# Patient Record
Sex: MALE | Race: WHITE | NOT HISPANIC OR LATINO | Employment: OTHER | URBAN - METROPOLITAN AREA
[De-identification: names, ages, dates, MRNs, and addresses within clinical notes are randomized per-mention and may not be internally consistent; named-entity substitution may affect disease eponyms.]

---

## 2022-10-04 ENCOUNTER — OFFICE VISIT (OUTPATIENT)
Dept: CARDIOLOGY CLINIC | Facility: CLINIC | Age: 57
End: 2022-10-04
Payer: COMMERCIAL

## 2022-10-04 VITALS
DIASTOLIC BLOOD PRESSURE: 78 MMHG | HEIGHT: 71 IN | WEIGHT: 233 LBS | SYSTOLIC BLOOD PRESSURE: 110 MMHG | HEART RATE: 68 BPM | BODY MASS INDEX: 32.62 KG/M2

## 2022-10-04 DIAGNOSIS — R73.03 PREDIABETES: ICD-10-CM

## 2022-10-04 DIAGNOSIS — R09.89 RIGHT CAROTID BRUIT: ICD-10-CM

## 2022-10-04 DIAGNOSIS — Z95.5 PRESENCE OF DRUG COATED STENT IN LAD CORONARY ARTERY: ICD-10-CM

## 2022-10-04 DIAGNOSIS — I25.5 ISCHEMIC CARDIOMYOPATHY: ICD-10-CM

## 2022-10-04 DIAGNOSIS — I21.02 MYOCARDIAL INFARCTION INVOLVING LEFT ANTERIOR DESCENDING (LAD) CORONARY ARTERY, UNSPECIFIED MI TYPE (HCC): Primary | ICD-10-CM

## 2022-10-04 DIAGNOSIS — E78.2 MIXED HYPERLIPIDEMIA: ICD-10-CM

## 2022-10-04 DIAGNOSIS — I25.10 CORONARY ARTERY DISEASE INVOLVING NATIVE CORONARY ARTERY OF NATIVE HEART WITHOUT ANGINA PECTORIS: ICD-10-CM

## 2022-10-04 PROCEDURE — 99204 OFFICE O/P NEW MOD 45 MIN: CPT | Performed by: INTERNAL MEDICINE

## 2022-10-04 PROCEDURE — 93000 ELECTROCARDIOGRAM COMPLETE: CPT | Performed by: INTERNAL MEDICINE

## 2022-10-04 RX ORDER — NITROGLYCERIN 0.4 MG/1
0.4 TABLET SUBLINGUAL
Qty: 100 TABLET | Refills: 0 | Status: SHIPPED | OUTPATIENT
Start: 2022-10-04

## 2022-10-04 RX ORDER — LANOLIN ALCOHOL/MO/W.PET/CERES
1 CREAM (GRAM) TOPICAL
COMMUNITY

## 2022-10-04 RX ORDER — ASPIRIN 81 MG/1
81 TABLET, COATED ORAL DAILY
COMMUNITY
Start: 2022-09-29

## 2022-10-04 RX ORDER — LOSARTAN POTASSIUM 25 MG/1
25 TABLET ORAL DAILY
COMMUNITY
Start: 2022-09-28 | End: 2023-09-25

## 2022-10-04 RX ORDER — FOLIC ACID 1 MG/1
800 TABLET ORAL
COMMUNITY

## 2022-10-04 RX ORDER — ATORVASTATIN CALCIUM 80 MG/1
80 TABLET, FILM COATED ORAL
COMMUNITY
Start: 2022-09-29

## 2022-10-04 RX ORDER — CARVEDILOL 3.12 MG/1
6.25 TABLET ORAL 2 TIMES DAILY WITH MEALS
COMMUNITY
Start: 2022-09-29

## 2022-10-04 NOTE — PATIENT INSTRUCTIONS
You were seen today in the Cardiology office for post hospital follow up after a heart attack  Please continue your current cardiac medications as prescribed  We discussed the benefits of (weight loss, healthy low-fat diet, salt reduction, fluid restriction)    Thank you for choosing [address]  Please call our office or use [employer] with any questions

## 2022-10-04 NOTE — PROGRESS NOTES
[facility]    Name:Addy Pineda   DOS: 10/4/2022     Chief Complaint:   Chief Complaint   Patient presents with    Follow-up     Had a heart attack 09/2022  "Just tired"  HISTORY OF PRESENT ILLNESS:    HPI:  Lenny Waldrop is a 61 y o  male  He  has a past medical history of CAD (coronary artery disease), Prediabetes, Presence of drug coated stent in LAD coronary artery (09/27/2022), and Unstable angina ([address] ) (09/2022)  He presents to establish care with a Cardiologist after a recent hospitalization for unstable angina that resulted in him receiving 2 drug eluting stents to his LAD  He works in construction locally, and states that he was in [place] last week doing a job there  While working, he began to experience indigestion with chest discomfort that gradually worsened  He presented to the hospital, and was found to have an anterior wall motion abnormality which prompted further evaluation with coronary angiography  Per records from his index hospitalization for Unstable Angina:  TTE:  Left Ventricle: Left ventricle size is normal  Findings consistent with eccentric hypertrophy  Mildly increased wall thickness  Regional wall motion abnormalities present  See diagram for wall motion findings  Mildly reduced systolic function  Estimated EF in the range of 40 - 45%  Grade I diastolic dysfunction  Normal LV diastolic pressure  Left Atrium: Left atrium is dilated  Right Ventricle: Not well visualized  Right ventricle size is normal  Low normal systolic function  [facility] 9/27/22:  Cardiac catheterization - Right radial access  Hemodynamic assessment demonstrated normal arterial pressure  Dominance: Right  Left Anterior Descending   There is 90% stenosis of the Prox LAD to Mid LAD  JHONATHAN flow is 2  This lesion is a likely culprit for the patient's clinical presentation  There is 70% stenosis of the Mid LAD  JHONATHAN flow is 2       First Diagonal Branch   There is 20% stenosis of the 1st Diag  Sequential mid LAD lesions (90% culprit, 70% more distal) treated with overlapping JOSETTE x 2       Today, he reports that he feels fatigue since his hospital discharge but otherwise has no active complaints  He has had no recurrence of his chest discomfort  He has had a few episodes of upset stomach from taking his medications on an empty stomach, but this has completely resolved after he started taking them with food  He denies chest pain, shortness of breath, diaphoresis, dizziness, palpitations, orthopnea, PND, edema, syncope  His procedure was right radial access, and the site is healing well with no pain/numbness/hematoma  He has no prior history of heart disease  He does have a family history of atrial fibrillation  No known family history of CAD, heart failure, or stroke  He denies regular tobacco use  Does smoke a cigar 1-2 times per year  Drinks socially in varying quantities  Denies recreational drug use  ROS    ROS: Pertinent positives and negatives as described in History of Present Illness  Remainder of a 14 point review of systems was negative  No Known Allergies     Current Outpatient Medications on File Prior to Visit   Medication Sig Dispense Refill    Aspirin Low Dose 81 MG EC tablet Take 81 mg by mouth daily      atorvastatin (LIPITOR) 80 mg tablet Take 80 mg by mouth daily at bedtime      carvedilol (COREG) 3 125 mg tablet Take 6 25 mg by mouth 2 (two) times a day with meals      folic acid (FOLVITE) 1 mg tablet Take 800 mcg by mouth      glucosamine-chondroitin 500-400 MG tablet Take 1 tablet by mouth      losartan (COZAAR) 25 mg tablet Take 25 mg by mouth daily      ticagrelor (BRILINTA) 90 MG Take 90 mg by mouth 2 (two) times a day       No current facility-administered medications on file prior to visit         Past Medical History:   Diagnosis Date    CAD (coronary artery disease)     Prediabetes     Presence of drug coated stent in LAD coronary artery 09/27/2022    JOSETTE x 2 to proximal and mid LAD @ [place]  Overlapping  38 and 16mm   Unstable angina ([facility] [address] 09/2022       No past surgical history on file  No family history on file  Social History     Socioeconomic History    Marital status: /Civil Union     Spouse name: Not on file    Number of children: Not on file    Years of education: Not on file    Highest education level: Not on file   Occupational History    Not on file   Tobacco Use    Smoking status: Not on file    Smokeless tobacco: Not on file   Substance and Sexual Activity    Alcohol use: Not on file    Drug use: Not on file    Sexual activity: Not on file   Other Topics Concern    Not on file   Social History Narrative    Not on file     Social Determinants of Health     Financial Resource Strain: Not on file   Food Insecurity: Not on file   Transportation Needs: Not on file   Physical Activity: Not on file   Stress: Not on file   Social Connections: Not on file   Intimate Partner Violence: Not on file   Housing Stability: Not on file       OBJECTIVE:    /78 (BP Location: Left arm, Patient Position: Sitting, Cuff Size: Large)   Pulse 68   Ht 5' 11" (1 803 m)   Wt 106 kg (233 lb)   BMI 32 50 kg/m²      BP Readings from Last 3 Encounters:   10/04/22 110/78       Wt Readings from Last 3 Encounters:   10/04/22 106 kg (233 lb)         Physical Exam  Vitals reviewed  Constitutional:       General: He is not in acute distress  Appearance: Normal appearance  He is not diaphoretic  HENT:      Head: Normocephalic and atraumatic  Eyes:      Conjunctiva/sclera: Conjunctivae normal    Neck:      Vascular: Carotid bruit (Right) present  No JVD  Cardiovascular:      Rate and Rhythm: Normal rate and regular rhythm  Pulses: Normal pulses  Heart sounds: Normal heart sounds  No murmur heard  No friction rub  No gallop  Comments: Right radial access site is well healed   Non-tender, no hematoma  Abdominal:      General: Abdomen is flat  Bowel sounds are normal  There is no distension  Palpations: Abdomen is soft  Musculoskeletal:      Right lower leg: No edema  Left lower leg: No edema  Skin:     General: Skin is warm and dry  Neurological:      General: No focal deficit present  Mental Status: He is alert and oriented to person, place, and time  Psychiatric:         Mood and Affect: Mood normal          Behavior: Behavior normal                                                        Cardiac testing:   EKG reviewed personally 10/4/22: Normal sinus rhythm  Anterior T-wave inversions with age indeterminate septal infarct pattern  LABS:  No results found for: GLUCOSE, BUN, CREATININE, CALCIUM, NA, K, CO2, CL, ALKPHOS, BILITOT, PROT, AST, ALT, ANIONGAP     No results found for: WBC, HGB, HCT, MCV, PLT    No results found for: CHOL, HDL, LDLCALC, TRIG    Lab Results   Component Value Date    HGBA1C 6 0 09/28/2022       No results found for: TSH      ASSESSMENT/PLAN:  Diagnoses and all orders for this visit:    Myocardial infarction involving left anterior descending (LAD) coronary artery, unspecified MI type ([facility] [address] )  Coronary artery disease involving native coronary artery of native heart without angina pectoris  Presence of drug coated stent in LAD coronary artery  - S/P JOSETTE to his LAD x2 in the setting of unstable angina  No recurrence of pain  - Continue Aspirin, Brilinta, Coreg, Losartan  - Referral to cardiac rehab  -     POCT ECG  -     Ambulatory  Referral to Cardiac Rehabilitation; Future  -     nitroglycerin (NITROSTAT) 0 4 mg SL tablet; Place 1 tablet (0 4 mg total) under the tongue every 5 (five) minutes as needed for chest pain If no relief with 2 doses, call 911  Ischemic cardiomyopathy  - In the setting of unstable angina requiring PCI to the LAD as noted above  LVEF mildly reduced, 40-45%  - On appropriate medical therapy, Coreg and Losartan  Re-evaluate LVEF in 90 days after revascularization  Mixed hyperlipidemia  - Per records from [facility] (scanned into the chart)   Lipid panel on 1/20/21 - Total cholesterol 237  HDL 71  Trig 120      - Continue high intensity statin therapy  Prediabetes  BMI Counseling: Body mass index is 32 5 kg/m²  The BMI is above normal  Nutrition recommendations include consuming healthier snacks, decreasing soda and/or juice intake, moderation in carbohydrate intake, increasing intake of lean protein, reducing intake of saturated fat and trans fat and reducing intake of cholesterol  Exercise recommendations include exercising 3-5 times per week  With cardiac rehab  Goal will be to lose 10% body total body weight within the next 12 months with diet and exercise  Right carotid bruit  - Noted on exam today  No history of CVA or TIA  Will screen with an ultrasound  -     VAS carotid complete study; Future      Other orders  -     Aspirin Low Dose 81 MG EC tablet; Take 81 mg by mouth daily  -     atorvastatin (LIPITOR) 80 mg tablet; Take 80 mg by mouth daily at bedtime  -     carvedilol (COREG) 3 125 mg tablet; Take 6 25 mg by mouth 2 (two) times a day with meals  -     folic acid (FOLVITE) 1 mg tablet; Take 800 mcg by mouth  -     glucosamine-chondroitin 500-400 MG tablet; Take 1 tablet by mouth  -     losartan (COZAAR) 25 mg tablet; Take 25 mg by mouth daily  -     ticagrelor (BRILINTA) 90 MG;  Take 90 mg by mouth 2 (two) times a day                Classie Carrel, MD

## 2022-10-07 ENCOUNTER — CLINICAL SUPPORT (OUTPATIENT)
Dept: CARDIAC REHAB | Facility: HOSPITAL | Age: 57
End: 2022-10-07
Attending: INTERNAL MEDICINE
Payer: COMMERCIAL

## 2022-10-07 DIAGNOSIS — I21.02 MYOCARDIAL INFARCTION INVOLVING LEFT ANTERIOR DESCENDING (LAD) CORONARY ARTERY, UNSPECIFIED MI TYPE (HCC): ICD-10-CM

## 2022-10-07 DIAGNOSIS — Z95.5 PRESENCE OF DRUG COATED STENT IN LAD CORONARY ARTERY: Primary | ICD-10-CM

## 2022-10-07 DIAGNOSIS — I25.10 CORONARY ARTERY DISEASE INVOLVING NATIVE CORONARY ARTERY OF NATIVE HEART WITHOUT ANGINA PECTORIS: ICD-10-CM

## 2022-10-07 PROCEDURE — 93798 PHYS/QHP OP CAR RHAB W/ECG: CPT

## 2022-10-07 PROCEDURE — 93797 PHYS/QHP OP CAR RHAB WO ECG: CPT

## 2022-10-07 NOTE — PROGRESS NOTES
Cardiac Rehabilitation Plan of Care   Care Plan       Today's date: 10/7/2022   Visits: 1-evaluation  Patient name: Oseas Villanueva      : 1965  Age: 61 y o  MRN: [id number]  Referring Physician: Amelia Epps MD  Cardiologist: Dr Evette Richards  Provider: Cait  Clinician: Myesha Hayes MS, CEP      Dx:   Encounter Diagnoses   Name Primary?  Myocardial infarction involving left anterior descending (LAD) coronary artery, unspecified MI type ([facility] [address] )     Coronary artery disease involving native coronary artery of native heart without angina pectoris     Presence of drug coated stent in LAD coronary artery      Date of onset: 2022      SUMMARY OF PROGRESS:  Mr Lorne Keene is here today, accompanied by his wife, for his cardiac rehabilitation evaluation after recent MI and stenting procedure  He reports feeling well overall, but does note tiredness and low energy levels since MI  His main goals for his program are to return to work as a   He enjoys doing work outside at home, which he is eager to get back to doing without limitations also  He would like to establish a regular exercise program and continue exercising on his own swimming at the [facility] this winter  He would also like to lose weight -20 lbs ST  He reports he and his wife have been making significant changes to his diet over the past week since he had MI  They are working on decreasing fat and sodium and trying to find recipes that will fit into a heart healthy eating plan  They are also interested in meeting with a dietician for more information and meal planning  He denies depression (PHQ-9=9), but does note feeling tired and little interest in things right now  He also has some feelings of anxiety (SRI-7=4)  He reports he has had significant stress over the past several months, but feels he is in a better place at this time with that  Will focus on relaxation techniques to help manage any ongoing stress   He reports excellent support from his wife and family  He completed TM ETT today reaching THR at 9 min at 4 3 METs  He tolerated exercise well with normal hemodynamic response to exercise  Telemetry showed NSR  BP is stable within normal resting limits  He is in agreement to attend cardiac rehab sessions 3x/week for 12 weeks, or 36 sessions  He will start his sessions on 10/11/22        Medication compliance: Yes   Comments: n/a  Fall Risk: Low   Comments: n/a    EKG changes: NSR      EXERCISE ASSESSMENT and PLAN    Current Exercise Program in Rehab:       Frequency: 3days/week        Minutes: 30-40         METS: 3 0-3 5            HR: 30 beats above resting   RPE: 4-6         Modalities: Treadmill, UBE, Lifecycle, NuStep and Recumbent bike      Exercise Progression 30 Day Goals :    Frequency: 3 days/week   Minutes: 40   METS: 3 5   HR: 30 beats above resting    RPE: 4-6   Modalities: Treadmill, UBE, Lifecycle, Elliptical, Rower, NuStep and Recumbent bike    Strength trainin-3 days / week  12-15 repetitions  1-2 sets per modality   Will be added following 2-3 weeks of monitored exercise sessions   Modalities: Pull Downs, Lateral Raise, Arm Extension, Arm Curl, Seated Row, Front Raises, Shoulder Shrugs and leg ext    Progressing:  Reviewed Pt goals and determined plan of care    Home Exercise: Type: walking around property, Frequency: daily days/week, Duration: 20 mins    Goals: 10% improvement in functional capacity - based on max METs achieved in fitness assessment, improved DASI score by 10%, Increase in exercise capacity by 40% - based on peak METs tolerated in cardiac rehab exercise session, Exercise 5 days/wk, >150mins/wk of moderate intensity exercise, Resume ADLs with increased strength, Return to work unrestricted, Attend Rehab regularly, Decrease sitting time, Start a walking program, start a home exercise program and start swimming at [employer]: benefit of exercise for CAD risk factors, home exercise guidelines, AHA guidelines to achieve >150 mins/wk of moderate exercise and RPE scale   Plan:education on home exercise guidelines, home exercise 30+ mins 2 days opposite CR and Education class: Risk Factors for Heart Disease  Readiness to change: Action:  (Changing behavior)      NUTRITION ASSESSMENT AND PLAN    Weight control:    Starting weight: 234 6 lb   Current weight:     Diabetes: Pre-diabetes  Lipid management: Discussed diet and lipid management and Last lipid profile 9/28/2022  Chol 186    HDL 59  LDL 97  Goals:LDL <100, HDL >40, TRG <150 and CHOL <200  Education: heart healthy eating  low sodium diet  hydration  nutrition for  lipid management  nutrition for Improved BG control  target goal for A1c <7 0  exercise and diabetes management   wt  loss   Progressing:Reviewed Pt goals and determined plan of care  Plan: Education class: Reading Food Labels, Education Class: Heart Healthy Eating, refer to diabetes educator and refer to medical nutrition therapy  Readiness to change: Preparation:  (Getting ready to change)       PSYCHOSOCIAL ASSESSMENT AND PLAN    Emotional:  Depression assessment:  PHQ-9 = 5-9 = Mild Depression-denies feeling down, but reports little interest in things and tiredness            Anxiety measure:  SRI-7 = 0-4  = Not anxious  Self-reported stress level:  4  Social support: Excellent-wife  Goals:  Reduce perceived stress to 1-3/10, PHQ-9 - reduced severity by one level, Physical Fitness in [facility] Score < 3, Daily Activity in [facility] Score < 3, Social Activities in [facility] Score < 3, Pain in [facility] Score < 3, Overall Health in [facility] Score < 3, Quality of Life in [facility] Score < 3 , Change in Health in [facility] Score < 3 , Increased interest in doing things, improved concentration, improved positive thoughts of well being, increased energy, take time to relax and Feel less anxious  Education: signs/sxs of depression, benefits of a positive support system and stress management techniques  Progressing:Reviewed Pt goals and determined plan of care  Plan: Class: Stress and Your Health and Class: Relaxation  Readiness to change: Preparation:  (Getting ready to change)       OTHER CORE COMPONENTS     Tobacco:   Social History     Tobacco Use   Smoking Status Not on file   Smokeless Tobacco Not on file       Tobacco Use Intervention:   N/A:  Patient is a non-smoker     Blood pressure:    Restin/70   Exercise: 162/78    Goals: consistent BP < 130/80, reduced dietary sodium <2300mg, moderate intensity exercise >150 mins/wk, medication compliance and reduce number of medications  needed for BP control  Education:  understanding high blood pressure and it's relationship to CAD and low sodium diet and HTN  Progressing:Reviewed Pt goals and determined plan of care  Plan: Class: Understanding Heart Disease and Class: Common Heart Medications  Readiness to change: Action:  (Changing behavior)

## 2022-10-11 ENCOUNTER — CLINICAL SUPPORT (OUTPATIENT)
Dept: CARDIAC REHAB | Facility: HOSPITAL | Age: 57
End: 2022-10-11
Payer: COMMERCIAL

## 2022-10-11 DIAGNOSIS — Z95.5 PRESENCE OF DRUG COATED STENT IN LAD CORONARY ARTERY: Primary | ICD-10-CM

## 2022-10-11 PROCEDURE — 93798 PHYS/QHP OP CAR RHAB W/ECG: CPT

## 2022-10-12 ENCOUNTER — HOSPITAL ENCOUNTER (OUTPATIENT)
Dept: NON INVASIVE DIAGNOSTICS | Age: 57
Discharge: HOME | End: 2022-10-12
Payer: COMMERCIAL

## 2022-10-12 DIAGNOSIS — R09.89 RIGHT CAROTID BRUIT: ICD-10-CM

## 2022-10-12 PROCEDURE — 93880 EXTRACRANIAL BILAT STUDY: CPT

## 2022-10-12 PROCEDURE — 93880 EXTRACRANIAL BILAT STUDY: CPT | Performed by: SURGERY

## 2022-10-13 ENCOUNTER — CLINICAL SUPPORT (OUTPATIENT)
Dept: CARDIAC REHAB | Facility: HOSPITAL | Age: 57
End: 2022-10-13
Payer: COMMERCIAL

## 2022-10-13 DIAGNOSIS — Z95.5 PRESENCE OF DRUG COATED STENT IN LAD CORONARY ARTERY: Primary | ICD-10-CM

## 2022-10-13 PROCEDURE — 93798 PHYS/QHP OP CAR RHAB W/ECG: CPT

## 2022-10-14 ENCOUNTER — CLINICAL SUPPORT (OUTPATIENT)
Dept: CARDIAC REHAB | Facility: HOSPITAL | Age: 57
End: 2022-10-14
Payer: COMMERCIAL

## 2022-10-14 DIAGNOSIS — Z95.5 S/P CORONARY ARTERY STENT PLACEMENT: ICD-10-CM

## 2022-10-14 PROCEDURE — 93798 PHYS/QHP OP CAR RHAB W/ECG: CPT

## 2022-10-18 ENCOUNTER — CLINICAL SUPPORT (OUTPATIENT)
Dept: CARDIAC REHAB | Facility: HOSPITAL | Age: 57
End: 2022-10-18
Payer: COMMERCIAL

## 2022-10-18 DIAGNOSIS — Z95.5 S/P CORONARY ARTERY STENT PLACEMENT: Primary | ICD-10-CM

## 2022-10-18 PROCEDURE — 93798 PHYS/QHP OP CAR RHAB W/ECG: CPT

## 2022-10-20 ENCOUNTER — APPOINTMENT (OUTPATIENT)
Dept: CARDIAC REHAB | Facility: HOSPITAL | Age: 57
End: 2022-10-20

## 2022-10-21 ENCOUNTER — APPOINTMENT (OUTPATIENT)
Dept: CARDIAC REHAB | Facility: HOSPITAL | Age: 57
End: 2022-10-21

## 2022-10-25 ENCOUNTER — CLINICAL SUPPORT (OUTPATIENT)
Dept: CARDIAC REHAB | Facility: HOSPITAL | Age: 57
End: 2022-10-25
Payer: COMMERCIAL

## 2022-10-25 DIAGNOSIS — Z95.5 S/P CORONARY ARTERY STENT PLACEMENT: Primary | ICD-10-CM

## 2022-10-25 PROCEDURE — 93798 PHYS/QHP OP CAR RHAB W/ECG: CPT

## 2022-10-27 ENCOUNTER — CLINICAL SUPPORT (OUTPATIENT)
Dept: CARDIAC REHAB | Facility: HOSPITAL | Age: 57
End: 2022-10-27
Payer: COMMERCIAL

## 2022-10-27 DIAGNOSIS — Z95.5 S/P CORONARY ARTERY STENT PLACEMENT: Primary | ICD-10-CM

## 2022-10-27 PROCEDURE — 93798 PHYS/QHP OP CAR RHAB W/ECG: CPT

## 2022-10-28 ENCOUNTER — CLINICAL SUPPORT (OUTPATIENT)
Dept: CARDIAC REHAB | Facility: HOSPITAL | Age: 57
End: 2022-10-28
Payer: COMMERCIAL

## 2022-10-28 DIAGNOSIS — Z95.5 S/P CORONARY ARTERY STENT PLACEMENT: Primary | ICD-10-CM

## 2022-10-28 PROCEDURE — 93798 PHYS/QHP OP CAR RHAB W/ECG: CPT

## 2022-11-01 ENCOUNTER — CLINICAL SUPPORT (OUTPATIENT)
Dept: CARDIAC REHAB | Facility: HOSPITAL | Age: 57
End: 2022-11-01

## 2022-11-01 DIAGNOSIS — Z95.5 S/P CORONARY ARTERY STENT PLACEMENT: Primary | ICD-10-CM

## 2022-11-03 ENCOUNTER — TELEPHONE (OUTPATIENT)
Dept: CARDIOLOGY CLINIC | Facility: CLINIC | Age: 57
End: 2022-11-03

## 2022-11-03 ENCOUNTER — CLINICAL SUPPORT (OUTPATIENT)
Dept: CARDIAC REHAB | Facility: HOSPITAL | Age: 57
End: 2022-11-03

## 2022-11-03 DIAGNOSIS — Z95.5 S/P CORONARY ARTERY STENT PLACEMENT: Primary | ICD-10-CM

## 2022-11-03 NOTE — TELEPHONE ENCOUNTER
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

## 2022-11-04 ENCOUNTER — CLINICAL SUPPORT (OUTPATIENT)
Dept: CARDIAC REHAB | Facility: HOSPITAL | Age: 57
End: 2022-11-04

## 2022-11-04 DIAGNOSIS — Z95.5 S/P CORONARY ARTERY STENT PLACEMENT: Primary | ICD-10-CM

## 2022-11-04 NOTE — TELEPHONE ENCOUNTER
I spoke with Mr Lorenzo Gonzalez and advised  He is self employed so doesn't need a letter for an employer regarding working light duty

## 2022-11-04 NOTE — PROGRESS NOTES
Cardiac Rehabilitation Plan of Care   30 day       Today's date: 2022   Visits: 11  Patient name: Heriberto Dodd      : 1965  Age: 61 y o  MRN: [id number]  Referring Physician: Rebecca Gosselin, MD  Cardiologist: Dr Cindi Mcbride  Provider: [address] [place] [address]  Clinician: Walter Arevalo MS, CEP       Dx:   Encounter Diagnosis   Name Primary? • S/P coronary artery stent placement Yes     Date of onset: 2022      SUMMARY OF PROGRESS:  Mr Maricruz Cabral has begun his cardiac rehabilitation program after recent MI and stenting procedure on 2022  He reports feeling well overall with improved energy levels and less tired throughout the day  Today he is reporting increased overall strength and endurance and feeling much better at 30 days  His main goals for his program are to return to work as a   He just put in a request to Dr Cindi Mcbride regarding clearance to RTW light duty  He enjoys doing work outside at home which he has begun doing again with no concerns  He would like to establish a regular exercise program and continue exercising on his own swimming at the [facility] this winter  He is returning back to the [facility] today and getting back to swimming  He is also doing home walking on days not in rehab  He would also like to lose weight -20 lbs ST  He has lost a total of 12 lb in the past 30 days  He is on track with his personal goals at 30 days and will continue to work towards them! He reports he and his wife have been making significant changes to his diet over the past week since he had MI  They are working on decreasing fat and sodium and trying to find recipes that will fit into a heart healthy eating plan  They are also interested in meeting with a dietician for more information and meal planning  He reports a heart healthy diet and weight loss of 12 lbs since starting the program   Reassessment of PHQ-9 and SRI-7 at 30 days   PHQ-9 screening scoring 2 = 1-4 = Minimal Depression which improved 1 level since initial and SRI-7 scoring 0 = 0-4  = Not anxious which is no change  He reports increased interest in doing things again since he is able to do them again and increased overall energy levels  He reports excellent support from his wife and family  He reports 100% medication compliant  He is completing 40-45 minutes of aerobic exercise reaching 3 3-5 1 METs on different modalities such as the TRM, rower, UBE, and recumbent bike  He has started a weight training program today  He is tolerating increasing workloads and durations well  Resting //70 with appropriate hemodynamic response to exercise 120//76  No changes to telemetry  He is attending weekly education classes on cardiac risk factors  Will continue to educate, monitor, and progress as tolerated in the next 30 days         Medication compliance: Yes   Comments: n/a  Fall Risk: Low   Comments: n/a    EKG changes: NSR      EXERCISE ASSESSMENT and PLAN    Current Exercise Program in Rehab:       Frequency: 3days/week        Minutes: 40-45        METS: 3 3-5 1           HR: 30 beats above resting   RPE: 4-6         Modalities: Treadmill, UBE, Lifecycle, Rower and Recumbent bike      Exercise Progression 30 Day Goals :    Frequency: 3 days/week   Minutes: 45-50   METS: 5 0-5 5   HR: 30 beats above resting    RPE: 4-6   Modalities: Treadmill, UBE, Lifecycle, Elliptical, Rower and Recumbent bike    Strength trainin-3 days / week  12-15 repetitions  1-2 sets per modality    Modalities: Pull Downs, Lateral Raise, Arm Extension, Arm Curl, Seated Row, Front Raises, Shoulder Shrugs and leg ext    Progressing:  Pt is progressing and showing improvement  toward the following goals:  attending rehab 2x/week regularly, increased overall strength and endurance, started strength training program, back to doing yardwork and ADLs, increasing overall MET levels, hoping to RTW light duty - clearance from MD, and completing home walking and getting back to the [facility] to start swimming    , Patient will join [facility] to swim 1-2x/week with rehab and continue working towards personal goals  in the next 30 days, Will continue to educate and progress as tolerated      Home Exercise: Type: walking around property, Frequency: daily days/week, Duration: 20 mins, Joining [employer] today to get back to swimming 1-2x/week with rehab    Goals: 10% improvement in functional capacity - based on max METs achieved in fitness assessment, improved DASI score by 10%, Increase in exercise capacity by 40% - based on peak METs tolerated in cardiac rehab exercise session, Exercise 5 days/wk, >150mins/wk of moderate intensity exercise, Resume ADLs with increased strength, Return to work unrestricted, Attend Rehab regularly, Decrease sitting time, Start a walking program, start a home exercise program and start swimming at [facility]: benefit of exercise for CAD risk factors, home exercise guidelines, AHA guidelines to achieve >150 mins/wk of moderate exercise and RPE scale   Plan:education on home exercise guidelines, home exercise 30+ mins 2 days opposite CR and Education class: Risk Factors for Heart Disease  Readiness to change: Action:  (Changing behavior)      NUTRITION ASSESSMENT AND PLAN    Weight control:    Starting weight: 234 6 lb   Current weight:   222 4 lbs     Diabetes: Pre-diabetes  Lipid management: Discussed diet and lipid management and Last lipid profile 9/28/2022  Chol 186    HDL 59  LDL 97  Goals:LDL <100, HDL >40, TRG <150 and CHOL <200  Education: heart healthy eating  low sodium diet  hydration  nutrition for  lipid management  nutrition for Improved BG control  target goal for A1c <7 0  exercise and diabetes management   wt  loss   Progressing:Pt is progressing and showing improvement  toward the following goals:  following heart healthy diet and many changes made since MI, down 12 lbs in 30 days, and watching salt and fat intake    , Patient will continue to work towards weight loss goal of 20 lbs  in the next 30 days, Will continue to educate and progress as tolerated  Plan: Education class: Reading Food Labels, Education Class: Heart Healthy Eating, refer to diabetes educator and refer to medical nutrition therapy  Readiness to change: Action:  (Changing behavior)      PSYCHOSOCIAL ASSESSMENT AND PLAN    Emotional:  Depression assessment:  PHQ-9 = 1-4 = Minimal Depression            Anxiety measure:  SRI-7 = 0-4  = Not anxious  Self-reported stress level:  4  Social support: Excellent-wife  Goals:  Reduce perceived stress to 1-3/10, PHQ-9 - reduced severity by one level, Physical Fitness in [facility] Score < 3, Daily Activity in [facility] Score < 3, Social Activities in [facility] Score < 3, Pain in [facility] Score < 3, Overall Health in [facility] Score < 3, Quality of Life in [facility] Score < 3 , Change in Health in [place] Score < 3 , Increased interest in doing things, improved concentration, improved positive thoughts of well being, increased energy, take time to relax and Feel less anxious  Education: signs/sxs of depression, benefits of a positive support system and stress management techniques     Progressing:Pt is progressing and showing improvement  toward the following goals:  decreased 1 level on PHQ-9 and no change to SRI-7 (low score), excellent support system, and increased interest in doing things with improved energy levels   , Will continue to educate and progress as tolerated       Plan: Class: Stress and Your Health and Class: Relaxation  Readiness to change: Action:  (Changing behavior)      OTHER CORE COMPONENTS     Tobacco:   Social History     Tobacco Use   Smoking Status Not on file   Smokeless Tobacco Not on file       Tobacco Use Intervention:   N/A:  Patient is a non-smoker     Blood pressure:    Restin//70    Exercise: 120//76    Goals: consistent BP < 130/80, reduced dietary sodium <2300mg, moderate intensity exercise >150 mins/wk, medication compliance and reduce number of medications  needed for BP control  Education:  understanding high blood pressure and it's relationship to CAD and low sodium diet and HTN     Progressing:Pt is progressing and showing improvement  toward the following goals:  medication compliant, BPs within normal limits at rest and normal respone to exercise, low sodium intake and watching labels, and achieving >150 mins/week of moderate intensity exercise    , Will continue to educate and progress as tolerated       Plan: Class: Understanding Heart Disease and Class: Common Heart Medications  Readiness to change: Action:  (Changing behavior)

## 2022-11-08 ENCOUNTER — CLINICAL SUPPORT (OUTPATIENT)
Dept: CARDIAC REHAB | Facility: HOSPITAL | Age: 57
End: 2022-11-08

## 2022-11-08 DIAGNOSIS — Z95.5 S/P CORONARY ARTERY STENT PLACEMENT: Primary | ICD-10-CM

## 2022-11-10 ENCOUNTER — CLINICAL SUPPORT (OUTPATIENT)
Dept: CARDIAC REHAB | Facility: HOSPITAL | Age: 57
End: 2022-11-10

## 2022-11-10 DIAGNOSIS — Z95.5 S/P CORONARY ARTERY STENT PLACEMENT: Primary | ICD-10-CM

## 2022-11-11 ENCOUNTER — CLINICAL SUPPORT (OUTPATIENT)
Dept: CARDIAC REHAB | Facility: HOSPITAL | Age: 57
End: 2022-11-11

## 2022-11-11 DIAGNOSIS — Z95.5 S/P CORONARY ARTERY STENT PLACEMENT: Primary | ICD-10-CM

## 2022-11-15 ENCOUNTER — CLINICAL SUPPORT (OUTPATIENT)
Dept: CARDIAC REHAB | Facility: HOSPITAL | Age: 57
End: 2022-11-15

## 2022-11-15 DIAGNOSIS — Z95.5 S/P CORONARY ARTERY STENT PLACEMENT: Primary | ICD-10-CM

## 2022-11-17 ENCOUNTER — CLINICAL SUPPORT (OUTPATIENT)
Dept: CARDIAC REHAB | Facility: HOSPITAL | Age: 57
End: 2022-11-17

## 2022-11-17 DIAGNOSIS — Z95.5 S/P CORONARY ARTERY STENT PLACEMENT: Primary | ICD-10-CM

## 2022-11-18 ENCOUNTER — CLINICAL SUPPORT (OUTPATIENT)
Dept: CARDIAC REHAB | Facility: HOSPITAL | Age: 57
End: 2022-11-18

## 2022-11-18 DIAGNOSIS — Z95.5 S/P CORONARY ARTERY STENT PLACEMENT: Primary | ICD-10-CM

## 2022-11-22 ENCOUNTER — CLINICAL SUPPORT (OUTPATIENT)
Dept: CARDIAC REHAB | Facility: HOSPITAL | Age: 57
End: 2022-11-22

## 2022-11-22 DIAGNOSIS — Z95.5 S/P CORONARY ARTERY STENT PLACEMENT: Primary | ICD-10-CM

## 2022-11-25 ENCOUNTER — CLINICAL SUPPORT (OUTPATIENT)
Dept: CARDIAC REHAB | Facility: HOSPITAL | Age: 57
End: 2022-11-25

## 2022-11-25 DIAGNOSIS — Z95.5 S/P CORONARY ARTERY STENT PLACEMENT: Primary | ICD-10-CM

## 2022-11-29 ENCOUNTER — CLINICAL SUPPORT (OUTPATIENT)
Dept: CARDIAC REHAB | Facility: HOSPITAL | Age: 57
End: 2022-11-29

## 2022-11-29 DIAGNOSIS — Z95.5 S/P CORONARY ARTERY STENT PLACEMENT: Primary | ICD-10-CM

## 2022-12-01 ENCOUNTER — CLINICAL SUPPORT (OUTPATIENT)
Dept: CARDIAC REHAB | Facility: HOSPITAL | Age: 57
End: 2022-12-01

## 2022-12-01 DIAGNOSIS — Z95.5 S/P CORONARY ARTERY STENT PLACEMENT: Primary | ICD-10-CM

## 2022-12-01 NOTE — PROGRESS NOTES
Cardiac Rehabilitation Plan of Care   60 day      Today's date: 2022   Visits: 21  Patient name: Iveth Strauss      : 1965  Age: 61 y o  MRN: [id number]  Referring Physician: Jam Murphy MD  Cardiologist: Dr Ventura Young  Provider: Cait  Clinician: Jeanie Celestin MS, CEP       Dx:   Encounter Diagnosis   Name Primary? • S/P coronary artery stent placement Yes     Date of onset: 2022      SUMMARY OF PROGRESS: 60 day ITP for Iveth Strauss in the Cardiac rehabilitation program with the diagnosis of S/P coronary artery stented placement  He has completed 21 exercise sessions where he exercises for 43-60 minutes and averages 4 8-5 8 METs  He is tolerating progression of intensity levels to maintain RPE 4-6  Resting BP  /64-82  with appropriate response to exercise reaching 118-156/66-84  NSR on tele observed  RHR 63-82, ExHR 121-136  Patient continues with outside work at home and walking on days not at rehab  He is returning back to the [facility] today and getting back to swimming  No cardiac complaints  Patient has lost 18 6  lbs since starting rehab  Patient states he is eating a heart healthy diet, which includes decreasing fat and sodium  They are also interested in meeting with a dietician for more information and meal planning  When addressed, the patient denies depression/anxiety  Patient reports excellent social/emotional support  Patient attends group educational classes on cardiac risk factor modification  His exercise program will be progressed as tolerated to maintain RPE 4-6  The patient has the following personal goals he hopes to achieved by discharge: be able to complete yardwork with no limitations, swim at the [facility], and reach weight loss goal of 20 lbs  Tolerating exercise well, will continue to monitor         Medication compliance: Yes   Comments: n/a  Fall Risk: Low   Comments: n/a    EKG changes: NSR      EXERCISE ASSESSMENT and PLAN    Current Exercise Program in Rehab:       Frequency: 3 days/week        Minutes: 43-60       METS: 4 8-5 8         HR: 121-136   RPE: 4-5         Modalities: Treadmill, UBE, Lifecycle, Rower and Recumbent bike      Exercise Progression 30 Day Goals :    Frequency: 3 days/week   Minutes: 45-60   METS: 5 0-6 0   HR: 30 beats above resting    RPE: 4-6   Modalities: Treadmill, UBE, Lifecycle, Elliptical, Rower and Recumbent bike    Strength trainin-3 days / week  12-15 repetitions  1-2 sets per modality    Modalities: Pull Downs, Lateral Raise, Arm Extension, Arm Curl, Seated Row, Front Raises, Shoulder Shrugs and leg ext    Progressing:  Pt is progressing and showing improvement  toward the following goals:  attending rehab 3x/week regularly, increased overall strength and endurance, started strength training program, back to doing yardwork and ADLs, increasing overall MET levels, hoping to RTW light duty - clearance from MD, and completing home walking and getting back to the [facility] to start swimming    , Patient will join [employer] to swim 1-2x/week with rehab and continue working towards personal goals  in the next 30 days, Will continue to educate and progress as tolerated      Home Exercise: Type: walking around property, Frequency: daily days/week, Duration: 20 mins, Joining [employer] today to get back to swimming 1-2x/week with rehab    Goals: 10% improvement in functional capacity - based on max METs achieved in fitness assessment, improved DASI score by 10%, Increase in exercise capacity by 40% - based on peak METs tolerated in cardiac rehab exercise session, Exercise 5 days/wk, >150mins/wk of moderate intensity exercise, Resume ADLs with increased strength, Return to work unrestricted, Attend Rehab regularly, Decrease sitting time, Start a walking program, start a home exercise program and start swimming at [facility]  Education: benefit of exercise for CAD risk factors, home exercise guidelines, AHA guidelines to achieve >150 mins/wk of moderate exercise, RPE scale and class: Risk Factors for Heart Disease   Plan:education on home exercise guidelines, home exercise 30+ mins 2 days opposite CR and Education class: Risk Factors for Heart Disease  Readiness to change: Action:  (Changing behavior)      NUTRITION ASSESSMENT AND PLAN    Weight control:    Starting weight: 234 6 lb   Current weight:   216 lbs     Diabetes: Pre-diabetes  Lipid management: Discussed diet and lipid management and Last lipid profile 9/28/2022  Chol 186    HDL 59  LDL 97  Goals:LDL <100, HDL >40, TRG <150 and CHOL <200  Education: heart healthy eating  low sodium diet  hydration  nutrition for  lipid management  nutrition for Improved BG control  target goal for A1c <7 0  exercise and diabetes management   wt  loss   education class:  Label Reading  Progressing:Pt is progressing and showing improvement  toward the following goals:  following heart healthy diet and many changes made since MI, down 18 lbs in 60 days, and watching salt and fat intake    , Patient will continue to work towards weight loss goal of 20 lbs  in the next 30 days, Will continue to educate and progress as tolerated       Plan: Education class: Reading Food Labels, Education Class: Heart Healthy Eating, refer to diabetes educator and refer to medical nutrition therapy  Readiness to change: Action:  (Changing behavior)      PSYCHOSOCIAL ASSESSMENT AND PLAN    Emotional:  Depression assessment:  PHQ-9 = 1-4 = Minimal Depression            Anxiety measure:  SRI-7 = 0-4  = Not anxious  Self-reported stress level:  4  Social support: Excellent-wife  Goals:  Reduce perceived stress to 1-3/10, PHQ-9 - reduced severity by one level, Physical Fitness in [facility] Score < 3, Daily Activity in [facility] Score < 3, Social Activities in [facility] Score < 3, Pain in [place] Score < 3, Overall Health in [facility] Score < 3, Quality of Life in [facility] Score < 3 , Change in Health in [facility] Score < 3 , Increased interest in doing things, improved concentration, improved positive thoughts of well being, increased energy, take time to relax and Feel less anxious  Education: signs/sxs of depression, benefits of a positive support system and stress management techniques     Progressing:Pt is progressing and showing improvement  toward the following goals:  decreased 1 level on PHQ-9 and no change to SRI-7 (low score), excellent support system, and increased interest in doing things with improved energy levels   , Will continue to educate and progress as tolerated  Plan: Class: Stress and Your Health and Class: Relaxation  Readiness to change: Action:  (Changing behavior)      OTHER CORE COMPONENTS     Tobacco:   Social History     Tobacco Use   Smoking Status Not on file   Smokeless Tobacco Not on file       Tobacco Use Intervention:   N/A:  Patient is a non-smoker     Blood pressure:    Restin-124/64-82    Exercise: 118-156/66-84    Goals: consistent BP < 130/80, reduced dietary sodium <2300mg, moderate intensity exercise >150 mins/wk, medication compliance and reduce number of medications  needed for BP control  Education:  understanding high blood pressure and it's relationship to CAD and low sodium diet and HTN     Progressing:Pt is progressing and showing improvement  toward the following goals:  medication compliant, BPs within normal limits at rest and normal respone to exercise, low sodium intake and watching labels, and achieving >150 mins/week of moderate intensity exercise    , Will continue to educate and progress as tolerated       Plan: Class: Understanding Heart Disease and Class: Common Heart Medications  Readiness to change: Action:  (Changing behavior)

## 2022-12-02 ENCOUNTER — CLINICAL SUPPORT (OUTPATIENT)
Dept: CARDIAC REHAB | Facility: HOSPITAL | Age: 57
End: 2022-12-02

## 2022-12-02 DIAGNOSIS — Z95.5 S/P CORONARY ARTERY STENT PLACEMENT: Primary | ICD-10-CM

## 2022-12-06 ENCOUNTER — CLINICAL SUPPORT (OUTPATIENT)
Dept: CARDIAC REHAB | Facility: HOSPITAL | Age: 57
End: 2022-12-06

## 2022-12-06 DIAGNOSIS — Z95.5 S/P CORONARY ARTERY STENT PLACEMENT: Primary | ICD-10-CM

## 2022-12-08 ENCOUNTER — CLINICAL SUPPORT (OUTPATIENT)
Dept: CARDIAC REHAB | Facility: HOSPITAL | Age: 57
End: 2022-12-08

## 2022-12-08 DIAGNOSIS — Z95.5 S/P CORONARY ARTERY STENT PLACEMENT: Primary | ICD-10-CM

## 2022-12-09 ENCOUNTER — CLINICAL SUPPORT (OUTPATIENT)
Dept: CARDIAC REHAB | Facility: HOSPITAL | Age: 57
End: 2022-12-09

## 2022-12-09 DIAGNOSIS — Z95.5 S/P CORONARY ARTERY STENT PLACEMENT: Primary | ICD-10-CM

## 2022-12-13 ENCOUNTER — CLINICAL SUPPORT (OUTPATIENT)
Dept: CARDIAC REHAB | Facility: HOSPITAL | Age: 57
End: 2022-12-13

## 2022-12-13 ENCOUNTER — OFFICE VISIT (OUTPATIENT)
Dept: CARDIOLOGY CLINIC | Facility: CLINIC | Age: 57
End: 2022-12-13

## 2022-12-13 VITALS
HEIGHT: 71 IN | WEIGHT: 211.2 LBS | BODY MASS INDEX: 29.57 KG/M2 | HEART RATE: 65 BPM | DIASTOLIC BLOOD PRESSURE: 60 MMHG | SYSTOLIC BLOOD PRESSURE: 108 MMHG

## 2022-12-13 DIAGNOSIS — I25.5 ISCHEMIC CARDIOMYOPATHY: ICD-10-CM

## 2022-12-13 DIAGNOSIS — R73.03 PREDIABETES: ICD-10-CM

## 2022-12-13 DIAGNOSIS — E78.2 MIXED HYPERLIPIDEMIA: ICD-10-CM

## 2022-12-13 DIAGNOSIS — Z95.5 S/P CORONARY ARTERY STENT PLACEMENT: Primary | ICD-10-CM

## 2022-12-13 DIAGNOSIS — I25.10 CORONARY ARTERY DISEASE INVOLVING NATIVE CORONARY ARTERY OF NATIVE HEART WITHOUT ANGINA PECTORIS: Primary | ICD-10-CM

## 2022-12-13 DIAGNOSIS — Z95.5 PRESENCE OF DRUG COATED STENT IN LAD CORONARY ARTERY: ICD-10-CM

## 2022-12-13 RX ORDER — TICAGRELOR 90 MG/1
90 TABLET ORAL EVERY 12 HOURS
COMMUNITY
Start: 2022-11-23

## 2022-12-13 NOTE — PROGRESS NOTES
[facility]    Name:Addy Pineda   DOS: 12/13/2022     Chief Complaint:   Chief Complaint   Patient presents with   • Follow-up     No complaints  HISTORY OF PRESENT ILLNESS:    HPI:  Zulma Seen is a 61 y o  male  He  has a past medical history of CAD (coronary artery disease), Prediabetes, Presence of drug coated stent in LAD coronary artery (09/27/2022), and Unstable angina ([address] ) (09/2022)  He presents for follow-up evaluation  He last saw me in the office on 10/4/2022 to establish care with a cardiologist after recently being hospitalized for unstable angina which resulted in him receiving 2 drug-eluting stents to his LAD  He was also found to have a mildly reduced LVEF of 40-45%, presumed to be an ischemic cardiomyopathy  Today, he reports no active cardiopulmonary complaints and states that he is doing quite well  Has continued to participate in cardiac rehab, with sessions scheduled all the way until January  He has resumed most of the physical activities that he previously participated in, stating that he started to swim regularly and is able to swim multiple laps without any symptoms  He works as a contractor, 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  He denies chest pain, shortness of breath, diaphoresis, dizziness, palpitations, orthopnea, PND, edema, syncope  Reports full compliance with all of his medications, and reports no adverse effects from these therapies  He has had no issues with bleeding, but does note easy bruising on dual antiplatelet therapy  ROS    ROS: Pertinent positives and negatives as described in History of Present Illness  Remainder of a 14 point review of systems was negative       Allergies   Allergen Reactions   • Sulfa Antibiotics Hives        Current Outpatient Medications on File Prior to Visit   Medication Sig Dispense Refill   • Aspirin Low Dose 81 MG EC tablet Take 81 mg by mouth daily     • atorvastatin (LIPITOR) 80 mg tablet Take 80 mg by mouth daily at bedtime     • carvedilol (COREG) 3 125 mg tablet Take 6 25 mg by mouth 2 (two) times a day with meals     • folic acid (FOLVITE) 1 mg tablet Take 800 mcg by mouth     • glucosamine-chondroitin 500-400 MG tablet Take 1 tablet by mouth     • losartan (COZAAR) 25 mg tablet Take 25 mg by mouth daily     • nitroglycerin (NITROSTAT) 0 4 mg SL tablet Place 1 tablet (0 4 mg total) under the tongue every 5 (five) minutes as needed for chest pain If no relief with 2 doses, call 911  100 tablet 0     No current facility-administered medications on file prior to visit  Past Medical History:   Diagnosis Date   • CAD (coronary artery disease)    • Prediabetes    • Presence of drug coated stent in LAD coronary artery 09/27/2022    JOSETTE x 2 to proximal and mid LAD @ [facility]  Overlapping  38 and 16mm  • Unstable angina ([facility] [address] ) 09/2022       No past surgical history on file  No family history on file  Social History     Socioeconomic History   • Marital status: /Civil Union     Spouse name: Not on file   • Number of children: Not on file   • Years of education: Not on file   • Highest education level: Not on file   Occupational History   • Not on file   Tobacco Use   • Smoking status: Not on file   • Smokeless tobacco: Not on file   Substance and Sexual Activity   • Alcohol use: Not on file   • Drug use: Not on file   • Sexual activity: Not on file   Other Topics Concern   • Not on file   Social History Narrative   • Not on file     Social Determinants of Health     Financial Resource Strain: Not on file   Food Insecurity: Not on file   Transportation Needs: Not on file   Physical Activity: Not on file   Stress: Not on file   Social Connections: Not on file   Intimate Partner Violence: Not on file   Housing Stability: Not on file       OBJECTIVE:    There were no vitals taken for this visit       BP Readings from Last 3 Encounters:   10/04/22 110/78       Wt Readings from Last 3 Encounters:   10/04/22 106 kg (233 lb)         Physical Exam  Vitals reviewed  Constitutional:       General: He is not in acute distress  Appearance: Normal appearance  He is not diaphoretic  HENT:      Head: Normocephalic and atraumatic  Eyes:      Conjunctiva/sclera: Conjunctivae normal    Neck:      Vascular: No JVD  Cardiovascular:      Rate and Rhythm: Normal rate and regular rhythm  Pulses: Normal pulses  Heart sounds: Normal heart sounds  No murmur heard  No friction rub  No gallop  Abdominal:      General: Abdomen is flat  Bowel sounds are normal  There is no distension  Palpations: Abdomen is soft  Musculoskeletal:      Right lower leg: No edema  Left lower leg: No edema  Skin:     General: Skin is warm and dry  Neurological:      General: No focal deficit present  Mental Status: He is alert and oriented to person, place, and time  Psychiatric:         Mood and Affect: Mood normal          Behavior: Behavior normal                                                        Cardiac testing:   EKG reviewed personally from 10/4/2022: Normal sinus rhythm, anterior T wave inversions with age-indeterminate septal infarct pattern  [facility] 9/27/22:  Cardiac catheterization - Right radial access  Hemodynamic assessment demonstrated normal arterial pressure  Dominance: Right  Left Anterior Descending   There is 90% stenosis of the Prox LAD to Mid LAD  JHONATHAN flow is 2  This lesion is a likely culprit for the patient's clinical presentation  There is 70% stenosis of the Mid LAD  JHONATHAN flow is 2  First Diagonal Branch   There is 20% stenosis of the 1st Diag       Sequential mid LAD lesions (90% culprit, 70% more distal) treated with overlapping JOSETTE x 2       LABS:  No results found for: GLUCOSE, BUN, CREATININE, CALCIUM, NA, K, CO2, CL, ALKPHOS, BILITOT, PROT, AST, ALT, ANIONGAP     No results found for: WBC, HGB, HCT, MCV, PLT    No results found for: CHOL, HDL, [address], DEE DEE    Lab Results   Component Value Date    HGBA1C 6 0 09/28/2022       No results found for: TSH      ASSESSMENT/PLAN:  Diagnoses and all orders for this visit:    Coronary artery disease involving native coronary artery of native heart without angina pectoris  Presence of drug coated stent in LAD coronary artery  Doing well  Continue dual antiplatelet therapy- aspirin and Brilinta  Continue carvedilol 3 125 twice daily  Continue losartan 25 mg twice daily  Ischemic cardiomyopathy  Clinically euvolemic by exam, with no subjective history to suggest heart failure  As he is now greater than 90 days out from his index event, I have recommended  -     Echo follow up/limited w/ contrast if indicated; Future    Mixed hyperlipidemia  Continue atorvastatin 80 mg once daily  Prediabetes  Counseled patient on diet, exercise  We will follow-up with his PCP for repeat testing in the outpatient setting  Other orders  -     Brilinta 90 MG;  Take 90 mg by mouth every 12 (twelve) hours                Andrews Muñoz MD

## 2022-12-13 NOTE — PATIENT INSTRUCTIONS
You were seen today in the Cardiology office for follow up evaluation  Please continue your current cardiac medications as prescribed  Thank you for choosing [address]  Please call our office or use [employer] with any questions

## 2022-12-15 ENCOUNTER — CLINICAL SUPPORT (OUTPATIENT)
Dept: CARDIAC REHAB | Facility: HOSPITAL | Age: 57
End: 2022-12-15

## 2022-12-15 DIAGNOSIS — Z95.5 S/P CORONARY ARTERY STENT PLACEMENT: Primary | ICD-10-CM

## 2022-12-16 ENCOUNTER — CLINICAL SUPPORT (OUTPATIENT)
Dept: CARDIAC REHAB | Facility: HOSPITAL | Age: 57
End: 2022-12-16

## 2022-12-16 DIAGNOSIS — Z95.5 S/P CORONARY ARTERY STENT PLACEMENT: Primary | ICD-10-CM

## 2022-12-20 ENCOUNTER — CLINICAL SUPPORT (OUTPATIENT)
Dept: CARDIAC REHAB | Facility: HOSPITAL | Age: 57
End: 2022-12-20

## 2022-12-20 DIAGNOSIS — Z95.5 S/P CORONARY ARTERY STENT PLACEMENT: Primary | ICD-10-CM

## 2022-12-22 ENCOUNTER — CLINICAL SUPPORT (OUTPATIENT)
Dept: CARDIAC REHAB | Facility: HOSPITAL | Age: 57
End: 2022-12-22

## 2022-12-22 DIAGNOSIS — Z95.5 S/P CORONARY ARTERY STENT PLACEMENT: Primary | ICD-10-CM

## 2022-12-23 ENCOUNTER — CLINICAL SUPPORT (OUTPATIENT)
Dept: CARDIAC REHAB | Facility: HOSPITAL | Age: 57
End: 2022-12-23

## 2022-12-23 DIAGNOSIS — Z95.5 S/P CORONARY ARTERY STENT PLACEMENT: Primary | ICD-10-CM

## 2022-12-27 ENCOUNTER — HOSPITAL ENCOUNTER (OUTPATIENT)
Dept: NON INVASIVE DIAGNOSTICS | Age: 57
Discharge: HOME | End: 2022-12-27

## 2022-12-27 ENCOUNTER — CLINICAL SUPPORT (OUTPATIENT)
Dept: CARDIAC REHAB | Facility: HOSPITAL | Age: 57
End: 2022-12-27

## 2022-12-27 VITALS
DIASTOLIC BLOOD PRESSURE: 68 MMHG | WEIGHT: 211 LBS | BODY MASS INDEX: 29.54 KG/M2 | SYSTOLIC BLOOD PRESSURE: 112 MMHG | HEIGHT: 71 IN | HEART RATE: 58 BPM

## 2022-12-27 DIAGNOSIS — I25.5 ISCHEMIC CARDIOMYOPATHY: ICD-10-CM

## 2022-12-27 DIAGNOSIS — Z95.5 S/P CORONARY ARTERY STENT PLACEMENT: Primary | ICD-10-CM

## 2022-12-28 LAB
AORTIC ROOT: 3.6 CM
APICAL FOUR CHAMBER EJECTION FRACTION: 56 %
E WAVE DECELERATION TIME: 330 MS
FRACTIONAL SHORTENING: 36 (ref 28–44)
INTERVENTRICULAR SEPTUM IN DIASTOLE (PARASTERNAL SHORT AXIS VIEW): 1.2 CM
INTERVENTRICULAR SEPTUM: 1.2 CM (ref 0.6–1.1)
LEFT INTERNAL DIMENSION IN SYSTOLE: 2.9 CM (ref 2.1–4)
LEFT VENTRICULAR INTERNAL DIMENSION IN DIASTOLE: 4.5 CM (ref 3.5–6)
LEFT VENTRICULAR POSTERIOR WALL IN END DIASTOLE: 1 CM
LEFT VENTRICULAR STROKE VOLUME: 61 ML
LVSV (TEICH): 61 ML
MV PEAK A VEL: 0.63 M/S
MV PEAK E VEL: 37 CM/S
MV STENOSIS PRESSURE HALF TIME: 96 MS
MV VALVE AREA P 1/2 METHOD: 2.29
SL CV LV EF: 50
SL CV PED ECHO LEFT VENTRICLE DIASTOLIC VOLUME (MOD BIPLANE) 2D: 92 ML
SL CV PED ECHO LEFT VENTRICLE SYSTOLIC VOLUME (MOD BIPLANE) 2D: 31 ML

## 2022-12-29 ENCOUNTER — CLINICAL SUPPORT (OUTPATIENT)
Dept: CARDIAC REHAB | Facility: HOSPITAL | Age: 57
End: 2022-12-29

## 2022-12-29 DIAGNOSIS — Z95.5 S/P CORONARY ARTERY STENT PLACEMENT: Primary | ICD-10-CM

## 2022-12-29 NOTE — PROGRESS NOTES
Cardiac Rehabilitation Plan of Care   90 day       Today's date: 2022   Visits: 33  Patient name: Gisel Delarosa      : 1965  Age: 61 y o  MRN: [id number]  Referring Physician: Stevo Rodriguez MD  Cardiologist: Dr Margret Charles  Provider: [address] [place] [address]  Clinician: Ciara Larios MS, CEP       Dx:   Encounter Diagnosis   Name Primary? • S/P coronary artery stent placement Yes     Date of onset: 2022      SUMMARY OF PROGRESS: 90 day ITP for Gisel Delarosa in the Cardiac rehabilitation program with the diagnosis of S/P coronary artery stented placement done on 2022  He has completed 33 exercise sessions where he exercises for 45-60 minutes and averages 4 5-7 6 METs  He continues to increase his functional METs each session on different moadlites such as TRM, UBE, Rower, and Lifecycle  He is also weight training 2x/week  He is tolerating progression of intensity levels to maintain RPE 4-6  Resting BP  98//74 with appropriate response to exercise reaching 118//78  NSR on tele observed  Patient continues with outside work at home and walking on days not at rehab  He is returning back to the [facility] today and getting back to swimming  No cardiac complaints  Patient has lost 26 6  lbs since starting rehab  Patient states he is eating a heart healthy diet, which includes decreasing fat and sodium  He reports the holidays have been rough with cheating on certain things  They are also interested in meeting with a dietician for more information and meal planning  When addressed, the patient denies depression/anxiety  Patient reports excellent social/emotional support  Patient attended all group educational classes on cardiac risk factor modification  His exercise program will be progressed as tolerated to maintain RPE 4-6   The patient has the following personal goals he hopes to achieved by discharge: be able to complete yardwork with no limitations, swim at the [facility], and reach weight loss goal of 20 lbs  He is on target/met most of his personal goals at 90 days and has 3 more sessions until discharge  Tolerating exercise well, will continue to monitor  Medication compliance: Yes   Comments: n/a  Fall Risk: Low   Comments: n/a    EKG changes: NSR      EXERCISE ASSESSMENT and PLAN    Current Exercise Program in Rehab:       Frequency: 3 days/week        Minutes: 45-60       METS: 4 8-7 6        HR: 121-136   RPE: 4-5         Modalities: Treadmill, UBE, Lifecycle, Rower and Recumbent bike      Exercise Progression 30 Day Goals :    Frequency: 3 days/week   Minutes: 45-60   METS: 7 7-8 0   HR: 30 beats above resting    RPE: 4-6   Modalities: Treadmill, UBE, Lifecycle, Elliptical, Rower and Recumbent bike    Strength trainin-3 days / week  12-15 repetitions  1-2 sets per modality    Modalities: Pull Downs, Lateral Raise, Arm Extension, Arm Curl, Seated Row, Front Raises, Shoulder Shrugs and leg ext    Progressing:  Pt is progressing and showing improvement  toward the following goals:  attending rehab 3x/week regularly, increased overall strength and endurance, strength training program 2x/week, back to doing yardwork and ADLs, increasing overall MET levels, hoping to RTW light duty - clearance from MD, and completing home walking and back to the [facility] to start swimming    , Will continue to educate and progress as tolerated      Home Exercise: Type: walking around property, Frequency: daily days/week, Duration: 20 mins, Joined [employer] today to get back to swimming 1-2x/week with rehab    Goals: 10% improvement in functional capacity - based on max METs achieved in fitness assessment, improved DASI score by 10%, Increase in exercise capacity by 40% - based on peak METs tolerated in cardiac rehab exercise session, Exercise 5 days/wk, >150mins/wk of moderate intensity exercise, Resume ADLs with increased strength, Return to work unrestricted, Attend Rehab regularly, Decrease sitting time, Start a walking program, start a home exercise program and start swimming at [facility]  Education: benefit of exercise for CAD risk factors, home exercise guidelines, AHA guidelines to achieve >150 mins/wk of moderate exercise, RPE scale and class: Risk Factors for Heart Disease   Plan:education on home exercise guidelines, home exercise 30+ mins 2 days opposite CR and Education class: Risk Factors for Heart Disease  Readiness to change: Action:  (Changing behavior)      NUTRITION ASSESSMENT AND PLAN    Weight control:    Starting weight: 234 6 lb   Current weight:   208 lbs     Diabetes: Pre-diabetes  Lipid management: Discussed diet and lipid management and Last lipid profile 9/28/2022  Chol 186    HDL 59  LDL 97  Goals:LDL <100, HDL >40, TRG <150 and CHOL <200  Education: heart healthy eating  low sodium diet  hydration  nutrition for  lipid management  nutrition for Improved BG control  target goal for A1c <7 0  exercise and diabetes management   wt  loss   education class:  Label Reading     Progressing:Pt is progressing and showing improvement  toward the following goals:  following heart healthy diet and many changes made since MI, down 26 6 lbs in 90 days, and watching salt and fat intake    , Patient will get back to eating healthy following holidays  in the next 30 days, Will continue to educate and progress as tolerated       Plan: Education class: Reading Food Labels, Education Class: Heart Healthy Eating, refer to diabetes educator and refer to medical nutrition therapy  Readiness to change: Action:  (Changing behavior)      PSYCHOSOCIAL ASSESSMENT AND PLAN    Emotional:  Depression assessment:  PHQ-9 = 1-4 = Minimal Depression            Anxiety measure:  SRI-7 = 0-4  = Not anxious  Self-reported stress level:  4  Social support: Excellent-wife  Goals:  Reduce perceived stress to 1-3/10, PHQ-9 - reduced severity by one level, Physical Fitness in [facility] Score < 3, Daily Activity in [facility] Score < 3, Social Activities in [facility] Score < 3, Pain in [facility] Score < 3, Overall Health in [facility] Score < 3, Quality of Life in [facility] Score < 3 , Change in Health in [place] Score < 3 , Increased interest in doing things, improved concentration, improved positive thoughts of well being, increased energy, take time to relax and Feel less anxious  Education: signs/sxs of depression, benefits of a positive support system and stress management techniques     Progressing:Pt is progressing and showing improvement  toward the following goals:  Denies depression and anxiety, excellent support system, and increased interest in doing things with improved energy levels   , Will continue to educate and progress as tolerated  Plan: Class: Stress and Your Health and Class: Relaxation  Readiness to change: Action:  (Changing behavior)      OTHER CORE COMPONENTS     Tobacco:   Social History     Tobacco Use   Smoking Status Not on file   Smokeless Tobacco Not on file       Tobacco Use Intervention:   N/A:  Patient is a non-smoker     Blood pressure:    Restin//70   Exercise: 118//78    Goals: consistent BP < 130/80, reduced dietary sodium <2300mg, moderate intensity exercise >150 mins/wk, medication compliance and reduce number of medications  needed for BP control  Education:  understanding high blood pressure and it's relationship to CAD and low sodium diet and HTN   Progressing:Pt is progressing and showing improvement  toward the following goals:  medication compliant, BPs within normal limits at rest and normal respone to exercise, low sodium intake and watching labels, and achieving >150 mins/week of moderate intensity exercise    , Will continue to educate and progress as tolerated       Plan: Class: Understanding Heart Disease and Class: Common Heart Medications  Readiness to change: Action:  (Changing behavior)

## 2022-12-30 ENCOUNTER — APPOINTMENT (OUTPATIENT)
Dept: CARDIAC REHAB | Facility: HOSPITAL | Age: 57
End: 2022-12-30

## 2023-01-03 ENCOUNTER — CLINICAL SUPPORT (OUTPATIENT)
Dept: CARDIAC REHAB | Facility: HOSPITAL | Age: 58
End: 2023-01-03

## 2023-01-03 DIAGNOSIS — Z95.5 S/P CORONARY ARTERY STENT PLACEMENT: Primary | ICD-10-CM

## 2023-01-05 ENCOUNTER — CLINICAL SUPPORT (OUTPATIENT)
Dept: CARDIAC REHAB | Facility: HOSPITAL | Age: 58
End: 2023-01-05

## 2023-01-05 DIAGNOSIS — Z95.5 S/P CORONARY ARTERY STENT PLACEMENT: Primary | ICD-10-CM

## 2023-01-06 ENCOUNTER — CLINICAL SUPPORT (OUTPATIENT)
Dept: CARDIAC REHAB | Facility: HOSPITAL | Age: 58
End: 2023-01-06

## 2023-01-06 DIAGNOSIS — Z95.5 S/P CORONARY ARTERY STENT PLACEMENT: Primary | ICD-10-CM

## 2023-01-06 NOTE — PROGRESS NOTES
Cardiac Rehabilitation Plan of Care   Discharge Reassessment      Today's date: 2023   Visits: 36  Patient name: Sergey Muniz      : 1965  Age: 61 y o  MRN: [id number]  Referring Physician: Milady Ritter MD  Cardiologist: Dr Mateusz Lopez  Provider: Cait  Clinician: Vini Crawford MS, CEP        Dx:   Encounter Diagnosis   Name Primary? • S/P coronary artery stent placement Yes     Date of onset: 2022      SUMMARY OF PROGRESS: Sergey Muniz has completed his cardiac rehabilitation program at 36 sessions  He has done very well in his rehab program showing significant improvement in functional capacity and strength and endurance  His max METs on his treadmill ETT showed 123 8% change (4 29 METs to 9 6 METs)  Exercise METs increased from 3 2 to 7 5 (134% change)  At his rehab sessions he has been exercising 45-60 minutes and averages 4 5-7 5 METs  He is also weight training 2x/week  He is tolerating progression of intensity levels to maintain RPE 4-6  Resting BP  98//74 with appropriate response to exercise reaching 118//78  NSR on tele observed  Patient continues with outside work at home and walking on days not at rehab  He has been going to the [facility] and has been working on increasing endurance with swimming  He plans to continue regular exercise at the [facility] using TM, Rower, bikes, and swimming, as well as weights  No cardiac complaints  He has been working towards the following personal goals: be able to complete yardwork with no limitations, swim at the [facility], and reach weight loss goal of 20 lbs  He reports he has met his goals at completion of his program  He has returned to all activities and feels he is able to do work at home without limitations  Bruna Rinne has been doing very well with weight loss has lost 27  lbs since starting rehab  He states he is eating a heart healthy diet, which includes decreasing fat and sodium   He continues to work towards making more dietary changes decreasing times eating out each month, adding more non meat protein, and adding more nuts into his diet  He reports the holidays have been rough with cheating on certain things  He denies depression/anxiety  Patient reports excellent social/emotional support  Patient attended all group educational classes on cardiac risk factor modification  Medication compliance: Yes   Comments: n/a  Fall Risk: Low   Comments: n/a    EKG changes: NSR      EXERCISE ASSESSMENT and PLAN    Current Exercise Program in Rehab:       Frequency: 3 days/week        Minutes: 45-60       METS: 4 8-7 5       HR: 121-136   RPE: 4-5         Modalities: Treadmill, UBE, Lifecycle, Rower and Recumbent bike      Exercise Progression 30 Day Goals :    Frequency: 3 days/week   Minutes: 45-60   METS: 7 7-8 0   HR: 30 beats above resting    RPE: 4-6   Modalities: Treadmill, UBE, Lifecycle, Elliptical, Rower and Recumbent bike    Strength trainin-3 days / week  12-15 repetitions  1-2 sets per modality    Modalities: Pull Downs, Lateral Raise, Arm Extension, Arm Curl, Seated Row, Front Raises, Shoulder Shrugs and leg ext    Progressing:  Goals met: increased strength and endurance with exercise, shows increased functional capacity with increased MET levels (123 8% change on TM ETT and 134 4% change with daily MET levels)  Has plan to continue regular aerobic exercise at the [facility] with goal of 150-200 min/week        Home Exercise: Type: walking around property, Frequency: daily days/week, Duration: 20 mins, Joined [employer] today to get back to swimming 1-2x/week with rehab    Goals: 10% improvement in functional capacity - based on max METs achieved in fitness assessment, improved DASI score by 10%, Increase in exercise capacity by 40% - based on peak METs tolerated in cardiac rehab exercise session, Exercise 5 days/wk, >150mins/wk of moderate intensity exercise, Resume ADLs with increased strength, Return to work unrestricted, Attend Rehab regularly, Decrease sitting time, Start a walking program, start a home exercise program and start swimming at [facility]  Education: benefit of exercise for CAD risk factors, home exercise guidelines, AHA guidelines to achieve >150 mins/wk of moderate exercise, RPE scale and class: Risk Factors for Heart Disease   Plan:education on home exercise guidelines, home exercise 30+ mins 2 days opposite CR and Education class: Risk Factors for Heart Disease  Readiness to change: Action:  (Changing behavior)      NUTRITION ASSESSMENT AND PLAN    Weight control:    Starting weight: 234 6 lb   Current weight:   207 6 lbs     Diabetes: Pre-diabetes  Lipid management: Discussed diet and lipid management and Last lipid profile 9/28/2022  Chol 186    HDL 59  LDL 97  Goals:LDL <100, HDL >40, TRG <150 and CHOL <200  Education: heart healthy eating  low sodium diet  hydration  nutrition for  lipid management  nutrition for Improved BG control  target goal for A1c <7 0  exercise and diabetes management   wt  loss   education class:  Label Reading     Progressing: Goals met: reports doing very well with his diet overall and has made some small, bu significant changes to his diet  He is working on eating more vegetables, more non meat proteins, and eating out less often   He has lost a total of 27 lbs since starting his rehab program  He plans to continue working towards 190 lbs as long term goal       Plan: Education class: Reading Food Labels, Education Class: Heart Healthy Eating, refer to diabetes educator and refer to medical nutrition therapy  Readiness to change: Action:  (Changing behavior)      PSYCHOSOCIAL ASSESSMENT AND PLAN    Emotional:  Depression assessment:  PHQ-9 = 1-4 = Minimal Depression            Anxiety measure:  SRI-7 = 0-4  = Not anxious  Self-reported stress level:  4  Social support: Excellent-wife  Goals:  Reduce perceived stress to 1-3/10, PHQ-9 - reduced severity by one level, Physical Fitness in [facility] Score < 3, Daily Activity in [facility] Score < 3, Social Activities in [facility] Score < 3, Pain in [facility] Score < 3, Overall Health in [facility] Score < 3, Quality of Life in [facility] Score < 3 , Change in Health in [place] Score < 3 , Increased interest in doing things, improved concentration, improved positive thoughts of well being, increased energy, take time to relax and Feel less anxious  Education: signs/sxs of depression, benefits of a positive support system and stress management techniques     Progressing: Goals met: no concerns with depression or anxiety at this time  has great support from his family  PHQ-9 score improved from 9 to 2 at discharge         Plan: Class: Stress and Your Health and Class: Relaxation  Readiness to change: Action:  (Changing behavior)      OTHER CORE COMPONENTS     Tobacco:   Social History     Tobacco Use   Smoking Status Not on file   Smokeless Tobacco Not on file       Tobacco Use Intervention:   N/A:  Patient is a non-smoker     Blood pressure:    Restin//70   Exercise: 118//78    Goals: consistent BP < 130/80, reduced dietary sodium <2300mg, moderate intensity exercise >150 mins/wk, medication compliance and reduce number of medications  needed for BP control  Education:  understanding high blood pressure and it's relationship to CAD and low sodium diet and HTN   Progressing: Goals met: BP is stable within normal resting limits, no concerns  Taking medication as prescribed, losing weight, and reducing salt intake to help manage BP    Plan: Class: Understanding Heart Disease and Class: Common Heart Medications  Readiness to change: Action:  (Changing behavior)

## 2023-03-03 ENCOUNTER — HOSPITAL ENCOUNTER (EMERGENCY)
Facility: HOSPITAL | Age: 58
Discharge: HOME | End: 2023-03-03
Attending: EMERGENCY MEDICINE

## 2023-03-03 VITALS
RESPIRATION RATE: 18 BRPM | DIASTOLIC BLOOD PRESSURE: 82 MMHG | TEMPERATURE: 97.8 F | SYSTOLIC BLOOD PRESSURE: 152 MMHG | OXYGEN SATURATION: 100 % | HEART RATE: 48 BPM

## 2023-03-03 DIAGNOSIS — S05.00XA CORNEAL ABRASION: ICD-10-CM

## 2023-03-03 DIAGNOSIS — T15.92XA FOREIGN BODY OF LEFT EYE: Primary | ICD-10-CM

## 2023-03-03 RX ORDER — TETRACAINE HYDROCHLORIDE 5 MG/ML
1 SOLUTION OPHTHALMIC ONCE
Status: COMPLETED | OUTPATIENT
Start: 2023-03-03 | End: 2023-03-03

## 2023-03-03 RX ORDER — PURIFIED WATER 986 MG/ML
SOLUTION OPHTHALMIC ONCE
Status: COMPLETED | OUTPATIENT
Start: 2023-03-03 | End: 2023-03-03

## 2023-03-03 RX ORDER — TOBRAMYCIN 3 MG/ML
1 SOLUTION/ DROPS OPHTHALMIC
Qty: 5 ML | Refills: 0 | Status: SHIPPED | OUTPATIENT
Start: 2023-03-03 | End: 2023-03-10

## 2023-03-03 RX ADMIN — TETRACAINE HYDROCHLORIDE 1 DROP: 5 SOLUTION OPHTHALMIC at 18:14

## 2023-03-03 RX ADMIN — FLUORESCEIN SODIUM 1 STRIP: 1 STRIP OPHTHALMIC at 18:13

## 2023-03-03 RX ADMIN — PURIFIED WATER: 986 SOLUTION OPHTHALMIC at 18:13

## 2023-03-03 NOTE — DISCHARGE INSTRUCTIONS
Avoid rubbing your eye  Eye drop:  1 drop left eye every 4 hours while awake for 7 days  Follow up with eye doctor in 2-3 days for recheck

## 2023-03-03 NOTE — ED PROVIDER NOTES
History  Chief Complaint   Patient presents with   • Foreign Body in Eye     Patient reports foreign body in left eye  Patient states"he was at work when he got something in his left eye"  Patient unsure what got into his eye  Patient is a 62 y/o M with h/o CAD, MI that presents to the ED with foreign body left eye that occurred at work 3 hours  Patient states his fellow employee shook out a blanket and something flew in his eye  He states he flushed his eye multiple times, but the FB is still present  He wears corrective lenses  He denies blurred or double vision  He states his tetanus is UTD  History provided by:  Patient  Foreign Body in Eye  Location:  L eye  Suspected object: piece of plastic  Pain quality:  Burning  Pain severity:  Moderate  Duration:  3 hours  Timing:  Constant  Progression:  Unchanged  Chronicity:  New  Worsened by:  Nothing  Ineffective treatments:  None tried      Prior to Admission Medications   Prescriptions Last Dose Informant Patient Reported? Taking? Aspirin Low Dose 81 MG EC tablet   Yes No   Sig: Take 81 mg by mouth daily   Brilinta 90 MG   Yes No   Sig: Take 90 mg by mouth every 12 (twelve) hours   atorvastatin (LIPITOR) 80 mg tablet   Yes No   Sig: Take 80 mg by mouth daily at bedtime   carvedilol (COREG) 3 125 mg tablet   Yes No   Sig: Take 6 25 mg by mouth 2 (two) times a day with meals 2 tablets BID   folic acid (FOLVITE) 1 mg tablet   Yes No   Sig: Take 800 mcg by mouth   glucosamine-chondroitin 500-400 MG tablet   Yes No   Sig: Take 1 tablet by mouth   losartan (COZAAR) 25 mg tablet   Yes No   Sig: Take 25 mg by mouth daily   nitroglycerin (NITROSTAT) 0 4 mg SL tablet   No No   Sig: Place 1 tablet (0 4 mg total) under the tongue every 5 (five) minutes as needed for chest pain If no relief with 2 doses, call 911        Facility-Administered Medications: None       Past Medical History:   Diagnosis Date   • CAD (coronary artery disease)    • Myocardial infarction [facility])    • Prediabetes    • Presence of drug coated stent in LAD coronary artery 09/27/2022    JOSETTE x 2 to proximal and mid LAD @ [facility]  Overlapping  38 and 16mm  • Unstable angina ([address] ) 09/2022       Past Surgical History:   Procedure Laterality Date   • CHOLECYSTECTOMY     • CORONARY ANGIOPLASTY WITH STENT PLACEMENT     • VASECTOMY         History reviewed  No pertinent family history  I have reviewed and agree with the history as documented  E-Cigarette/Vaping   • E-Cigarette Use Never User      E-Cigarette/Vaping Substances     Social History     Tobacco Use   • Smoking status: Never   • Smokeless tobacco: Never   Vaping Use   • Vaping Use: Never used   Substance Use Topics   • Alcohol use: Yes     Comment: social   • Drug use: Never       Review of Systems   Constitutional: Negative for chills and fever  HENT: Negative  Eyes: Positive for pain and redness  Negative for discharge and visual disturbance  Respiratory: Negative  Skin: Negative for color change, pallor and rash  Neurological: Negative for dizziness, weakness and light-headedness  Psychiatric/Behavioral: Negative for confusion  All other systems reviewed and are negative  Physical Exam  Physical Exam  Vitals and nursing note reviewed  Constitutional:       General: He is not in acute distress  Appearance: Normal appearance  He is well-developed, well-groomed and normal weight  He is not ill-appearing or diaphoretic  HENT:      Head: Normocephalic and atraumatic  Right Ear: Hearing and external ear normal       Left Ear: Hearing and external ear normal       Nose: Nose normal       Mouth/Throat:      Mouth: Mucous membranes are moist       Pharynx: Oropharynx is clear  Eyes:      General: Lids are normal       Extraocular Movements: Extraocular movements intact  Conjunctiva/sclera:      Right eye: Right conjunctiva is not injected  Left eye: Left conjunctiva is injected  No chemosis  Pupils: Pupils are equal, round, and reactive to light  Left eye: Corneal abrasion (left eye) present  Slit lamp exam:     Left eye: Foreign body (left eye) present  Cardiovascular:      Rate and Rhythm: Regular rhythm  Bradycardia present  Heart sounds: Normal heart sounds  Pulmonary:      Effort: Pulmonary effort is normal       Breath sounds: Normal breath sounds  Musculoskeletal:      Cervical back: Normal range of motion and neck supple  Skin:     General: Skin is warm and dry  Coloration: Skin is not pale  Findings: No rash  Neurological:      Mental Status: He is alert and oriented to person, place, and time  Sensory: Sensation is intact  Gait: Gait is intact  Psychiatric:         Mood and Affect: Mood normal          Behavior: Behavior is cooperative  Vital Signs  ED Triage Vitals [03/03/23 1746]   Temperature Pulse Respirations Blood Pressure SpO2   97 8 °F (36 6 °C) (!) 48 18 152/82 100 %      Temp src Heart Rate Source Patient Position - Orthostatic VS BP Location FiO2 (%)   -- -- -- -- --      Pain Score       3           Vitals:    03/03/23 1746   BP: 152/82   Pulse: (!) 48         Visual Acuity  Visual Acuity    Flowsheet Row Most Recent Value   Visual acuity R eye is 20/20   Visual acuity Left eye is 20/20   Visual acuity in both eyes is 20/20   Wearing corrective eyewear/lenses?  Yes          ED Medications  Medications   tetracaine 0 5 % ophthalmic solution 1 drop (1 drop Both Eyes Given 3/3/23 1814)   fluorescein sodium sterile ophthalmic strip 1 strip (1 strip Both Eyes Given by Other 3/3/23 1813)   ophthalmic wash (EYE STREAM) ophthalmic solution ( Both Eyes Given by Other 3/3/23 1813)       Diagnostic Studies  Results Reviewed     None                 No orders to display              Procedures  Foreign Body - Ocular    Date/Time: 3/3/2023 6:00 PM  Performed by: Burton Pollard PA-C  Authorized by: Burton Pollard PA-C Patient location:  ED  Other Assisting Provider: No    Consent:     Consent obtained:  Verbal    Consent given by:  Patient    Risks discussed:  Pain, visual impairment, incomplete removal, infection and worsening of condition    Alternatives discussed:  Referral  Universal protocol:     Patient identity confirmed:  Verbally with patient  Location:     Location:  L corneal  Pre-procedure details:     Imaging:  None    OS visual acuity:  20/20    OD visual acuity:  20/20    Correction: corrected      Fluorescein exam: yes      Fluorescein uptake: yes      Corneal abrasion location:  Central  Anesthesia (see MAR for exact dosages):     Local anesthetic:  Tetracaine drops  Procedure details:     Localization method:  Direct visualization    Removal mechanism:  Moist cotton swab    Foreign bodies recovered:  1    Description:  Small white plastic piece of debris    Intact foreign body removal: yes      Residual rust ring observed: no    Post-procedure details:     Confirmation:  No additional foreign bodies on visualization    Dressing:  Antibiotic drops    Patient tolerance of procedure: Tolerated well, no immediate complications             ED Course                               SBIRT 22yo+    Flowsheet Row Most Recent Value   SBIRT (23 yo +)    In order to provide better care to our patients, we are screening all of our patients for alcohol and drug use  Would it be okay to ask you these screening questions? Yes Filed at: 03/03/2023 1816   Initial Alcohol Screen: US AUDIT-C     1  How often do you have a drink containing alcohol? 0 Filed at: 03/03/2023 1816   2  How many drinks containing alcohol do you have on a typical day you are drinking? 0 Filed at: 03/03/2023 1816   3a  Male UNDER 65: How often do you have five or more drinks on one occasion? 0 Filed at: 03/03/2023 1816   3b  FEMALE Any Age, or MALE 65+: How often do you have 4 or more drinks on one occassion?  0 Filed at: 03/03/2023 1816   Audit-C Score 0 Filed at: 03/03/2023 1816   AYESHA: How many times in the past year have you    Used an illegal drug or used a prescription medication for non-medical reasons? Never Filed at: 03/03/2023 1816                    Medical Decision Making  Patient with FB left eye, easily removed with cotton tip swab  Corneal abrasion on exam after FB removal, will prescribe abx drops  Tetanus UTD per patient  Patient instructed to f/u with optometrist doctor for recheck  Corneal abrasion: acute illness or injury  Foreign body of left eye: acute illness or injury  Risk  OTC drugs  Prescription drug management  Disposition  Final diagnoses:   Foreign body of left eye   Corneal abrasion - left eye     Time reflects when diagnosis was documented in both MDM as applicable and the Disposition within this note     Time User Action Codes Description Comment    3/3/2023  6:26 PM Margoth [place] Foreign body of left eye     3/3/2023  6:27 PM Malva Danes Add [S05 00XA] Corneal abrasion     3/3/2023  6:27 PM Malva Danes Modify [S05 00XA] Corneal abrasion left eye      ED Disposition     ED Disposition   Discharge    Condition   Stable    Date/Time   Fri Mar 3, 2023  6:26 PM    [facility] discharge to home/self care                 Follow-up Information     Follow up With Specialties Details Why Contact Info    your eye doctor  Go in 1 day For recheck           Discharge Medication List as of 3/3/2023  6:29 PM      START taking these medications    Details   tobramycin (TOBREX) 0 3 % SOLN Administer 1 drop into the left eye every 4 (four) hours while awake for 7 days, Starting Fri 3/3/2023, Until Fri 3/10/2023, Normal         CONTINUE these medications which have NOT CHANGED    Details   Aspirin Low Dose 81 MG EC tablet Take 81 mg by mouth daily, Starting Thu 9/29/2022, Historical Med      atorvastatin (LIPITOR) 80 mg tablet Take 80 mg by mouth daily at bedtime, Starting Thu 9/29/2022, Historical Med      Brilinta 90 MG Take 90 mg by mouth every 12 (twelve) hours, Starting Wed 11/23/2022, Historical Med      carvedilol (COREG) 3 125 mg tablet Take 6 25 mg by mouth 2 (two) times a day with meals 2 tablets BID, Starting Thu 9/29/2022, Historical Med      folic acid (FOLVITE) 1 mg tablet Take 800 mcg by mouth, Historical Med      glucosamine-chondroitin 500-400 MG tablet Take 1 tablet by mouth, Historical Med      losartan (COZAAR) 25 mg tablet Take 25 mg by mouth daily, Starting Wed 9/28/2022, Until Mon 9/25/2023, Historical Med      nitroglycerin (NITROSTAT) 0 4 mg SL tablet Place 1 tablet (0 4 mg total) under the tongue every 5 (five) minutes as needed for chest pain If no relief with 2 doses, call 911 , Starting Tue 10/4/2022, Normal             No discharge procedures on file      PDMP Review     None          ED Provider  Electronically Signed by           Rachel Martinez PA-C  03/03/23 3753

## 2023-03-08 ENCOUNTER — TELEPHONE (OUTPATIENT)
Dept: CARDIOLOGY CLINIC | Facility: CLINIC | Age: 58
End: 2023-03-08

## 2023-03-08 NOTE — TELEPHONE ENCOUNTER
Patient had a heart attack 5 months ago    He has a dental cleaning in a few weeks and wants to know if there is anything her needs to do with medications prior to? /todd

## 2023-03-09 NOTE — TELEPHONE ENCOUNTER
Pt returned call - advised pt that he is taking Brilinta & ASA post stent which should not be interrupted  Fax ## for this office given if his dental provider has any questions or needs clearance, to fax request here

## 2023-05-09 ENCOUNTER — HOSPITAL ENCOUNTER (EMERGENCY)
Facility: HOSPITAL | Age: 58
Discharge: HOME | End: 2023-05-09
Attending: EMERGENCY MEDICINE

## 2023-05-09 ENCOUNTER — APPOINTMENT (EMERGENCY)
Dept: CT IMAGING | Facility: HOSPITAL | Age: 58
End: 2023-05-09

## 2023-05-09 ENCOUNTER — APPOINTMENT (EMERGENCY)
Dept: RADIOLOGY | Facility: HOSPITAL | Age: 58
End: 2023-05-09

## 2023-05-09 VITALS
SYSTOLIC BLOOD PRESSURE: 122 MMHG | TEMPERATURE: 97.9 F | HEART RATE: 58 BPM | OXYGEN SATURATION: 99 % | BODY MASS INDEX: 26.78 KG/M2 | RESPIRATION RATE: 18 BRPM | DIASTOLIC BLOOD PRESSURE: 71 MMHG | WEIGHT: 192 LBS

## 2023-05-09 DIAGNOSIS — R20.2 FACIAL TINGLING: Primary | ICD-10-CM

## 2023-05-09 LAB
ALBUMIN SERPL BCP-MCNC: 4.5 G/DL (ref 3.5–5)
ALP SERPL-CCNC: 76 U/L (ref 34–104)
ALT SERPL W P-5'-P-CCNC: 30 U/L (ref 7–52)
ANION GAP SERPL CALCULATED.3IONS-SCNC: 6 MMOL/L (ref 4–13)
AST SERPL W P-5'-P-CCNC: 28 U/L (ref 13–39)
BASOPHILS # BLD AUTO: 0.05 THOUSANDS/ÂΜL (ref 0–0.1)
BASOPHILS NFR BLD AUTO: 1 % (ref 0–1)
BILIRUB SERPL-MCNC: 1.18 MG/DL (ref 0.2–1)
BUN SERPL-MCNC: 19 MG/DL (ref 5–25)
CALCIUM SERPL-MCNC: 10.1 MG/DL (ref 8.4–10.2)
CARDIAC TROPONIN I PNL SERPL HS: 8 NG/L
CHLORIDE SERPL-SCNC: 106 MMOL/L (ref 96–108)
CO2 SERPL-SCNC: 28 MMOL/L (ref 21–32)
CREAT SERPL-MCNC: 1.07 MG/DL (ref 0.6–1.3)
EOSINOPHIL # BLD AUTO: 0.09 THOUSAND/ÂΜL (ref 0–0.61)
EOSINOPHIL NFR BLD AUTO: 2 % (ref 0–6)
ERYTHROCYTE [DISTWIDTH] IN BLOOD BY AUTOMATED COUNT: 13.1 % (ref 11.6–15.1)
GFR SERPL CREATININE-BSD FRML MDRD: 76 ML/MIN/1.73SQ M
GLUCOSE SERPL-MCNC: 94 MG/DL (ref 65–140)
HCT VFR BLD AUTO: 46.5 % (ref 36.5–49.3)
HGB BLD-MCNC: 15.6 G/DL (ref 12–17)
IMM GRANULOCYTES # BLD AUTO: 0.02 THOUSAND/UL (ref 0–0.2)
IMM GRANULOCYTES NFR BLD AUTO: 0 % (ref 0–2)
LYMPHOCYTES # BLD AUTO: 1.1 THOUSANDS/ÂΜL (ref 0.6–4.47)
LYMPHOCYTES NFR BLD AUTO: 21 % (ref 14–44)
MCH RBC QN AUTO: 31.6 PG (ref 26.8–34.3)
MCHC RBC AUTO-ENTMCNC: 33.5 G/DL (ref 31.4–37.4)
MCV RBC AUTO: 94 FL (ref 82–98)
MONOCYTES # BLD AUTO: 0.45 THOUSAND/ÂΜL (ref 0.17–1.22)
MONOCYTES NFR BLD AUTO: 9 % (ref 4–12)
NEUTROPHILS # BLD AUTO: 3.57 THOUSANDS/ÂΜL (ref 1.85–7.62)
NEUTS SEG NFR BLD AUTO: 67 % (ref 43–75)
NRBC BLD AUTO-RTO: 0 /100 WBCS
PLATELET # BLD AUTO: 212 THOUSANDS/UL (ref 149–390)
PMV BLD AUTO: 9.9 FL (ref 8.9–12.7)
POTASSIUM SERPL-SCNC: 4.2 MMOL/L (ref 3.5–5.3)
PROT SERPL-MCNC: 6.9 G/DL (ref 6.4–8.4)
RBC # BLD AUTO: 4.94 MILLION/UL (ref 3.88–5.62)
SODIUM SERPL-SCNC: 140 MMOL/L (ref 135–147)
WBC # BLD AUTO: 5.28 THOUSAND/UL (ref 4.31–10.16)

## 2023-05-09 NOTE — ED PROCEDURE NOTE
PROCEDURE  ECG 12 Lead Documentation Only    Date/Time: 5/9/2023 1:43 PM  Performed by: Alan Frias MD  Authorized by: Alan Frias MD     Indications / Diagnosis:  R facial tingling   ECG reviewed by me, the ED Provider: yes    Patient location:  ED  Previous ECG:     Previous ECG:  Unavailable    Comparison to cardiac monitor: Yes    Interpretation:     Interpretation: non-specific    Rate:     ECG rate:  54    ECG rate assessment: bradycardic    Rhythm:     Rhythm: sinus bradycardia      Rhythm comment:  Sinus arrthymia  Ectopy:     Ectopy: none    QRS:     QRS axis:  Normal    QRS intervals:  Normal  Conduction:     Conduction: normal    ST segments:     ST segments:  Normal  T waves:     T waves: flattening      Flattening:  AVL, V1 and V2  Q waves:     Q waves:  AVL, V1 and V2  Other findings:     Other findings: U wave    Comments:      No ecg signs of ischemia/ injury          Alan Frias MD  05/09/23 6461

## 2023-05-09 NOTE — ED PROVIDER NOTES
"History  Chief Complaint   Patient presents with   • Numbness     PT c/o \"numbness on the right side of face\" PT denies any other symptoms     61 yr male with  stemi 9/22 with 2 lad stents- on dap--   since around 10 am at work with intermitent tingling lasting seconds at a time  From r mid scalp to r check area happening multiple times -- no r facial/sclap rash- no facial/scalp pain -- no head/neck pain -- no diplopia/ dysarthria/ dysphagia/ dysphonia/ dysmetria- normal gait-  No rue/rewl weakness/sensory comps-- - NO RASH - RECENT TICK BITE- NO HEARING CHANGE IN R EAR       History provided by:  Patient   used: No        Prior to Admission Medications   Prescriptions Last Dose Informant Patient Reported? Taking? Aspirin Low Dose 81 MG EC tablet   Yes No   Sig: Take 81 mg by mouth daily   Brilinta 90 MG   Yes No   Sig: Take 90 mg by mouth every 12 (twelve) hours   atorvastatin (LIPITOR) 80 mg tablet   Yes No   Sig: Take 80 mg by mouth daily at bedtime   carvedilol (COREG) 3 125 mg tablet   Yes No   Sig: Take 6 25 mg by mouth 2 (two) times a day with meals 2 tablets BID   folic acid (FOLVITE) 1 mg tablet   Yes No   Sig: Take 800 mcg by mouth   glucosamine-chondroitin 500-400 MG tablet   Yes No   Sig: Take 1 tablet by mouth   losartan (COZAAR) 25 mg tablet   Yes No   Sig: Take 25 mg by mouth daily   nitroglycerin (NITROSTAT) 0 4 mg SL tablet   No No   Sig: Place 1 tablet (0 4 mg total) under the tongue every 5 (five) minutes as needed for chest pain If no relief with 2 doses, call 911  Facility-Administered Medications: None       Past Medical History:   Diagnosis Date   • CAD (coronary artery disease)    • Myocardial infarction [facility])    • Prediabetes    • Presence of drug coated stent in LAD coronary artery 09/27/2022    JOSETTE x 2 to proximal and mid LAD @ [facility]  Overlapping  38 and 16mm     • Unstable angina ([facility] [address] ) 09/2022       Past Surgical History:   Procedure Laterality Date   • " CHOLECYSTECTOMY     • CORONARY ANGIOPLASTY WITH STENT PLACEMENT     • VASECTOMY         History reviewed  No pertinent family history  I have reviewed and agree with the history as documented  E-Cigarette/Vaping   • E-Cigarette Use Never User      E-Cigarette/Vaping Substances     Social History     Tobacco Use   • Smoking status: Never   • Smokeless tobacco: Never   Vaping Use   • Vaping Use: Never used   Substance Use Topics   • Alcohol use: Yes     Comment: social   • Drug use: Never       Review of Systems   Constitutional: Negative  HENT: Negative  Eyes: Negative  Respiratory: Negative  Cardiovascular: Negative  Gastrointestinal: Negative  Endocrine: Negative  Genitourinary: Negative  Musculoskeletal: Negative  Skin: Negative  Allergic/Immunologic: Negative  Neurological: Negative for dizziness, tremors, seizures, syncope, facial asymmetry, speech difficulty, weakness, light-headedness, numbness and headaches  Intermitent right mid forehead to r mid face tingling    Hematological: Negative  Psychiatric/Behavioral: Negative  Physical Exam  Physical Exam  Vitals and nursing note reviewed  Constitutional:       General: He is not in acute distress  Appearance: Normal appearance  He is not ill-appearing, toxic-appearing or diaphoretic  Comments: avss- bradycardia- pt is on bb- pulse ox 99 % on ra- interpretation is normal- no intervention - well appearing in nad    HENT:      Head: Normocephalic and atraumatic  Comments: No scalp tenderness- no rash - no temporal artery area tenderness-edema/ erythema- nodularity - no tmlj tenderNess- normal mastoid aREA      Right Ear: Tympanic membrane, ear canal and external ear normal  There is no impacted cerumen  Left Ear: Tympanic membrane, ear canal and external ear normal  There is no impacted cerumen  Nose: Nose normal  No congestion or rhinorrhea        Mouth/Throat:      Mouth: Mucous membranes are moist       Pharynx: Oropharynx is clear  No oropharyngeal exudate or posterior oropharyngeal erythema  Comments: SYM PALATE RISE   Eyes:      General: No scleral icterus  Right eye: No discharge  Left eye: No discharge  Extraocular Movements: Extraocular movements intact  Conjunctiva/sclera: Conjunctivae normal       Pupils: Pupils are equal, round, and reactive to light  Comments: NO PAPILLEDEMA BILATERALLY WITH PANOPTIC   Neck:      Vascular: No carotid bruit  Comments: NO PMT C/T/L/S SPINE - NO CAROTID BRUITS BILATERALLY   Cardiovascular:      Rate and Rhythm: Regular rhythm  Bradycardia present  Pulses: Normal pulses  Heart sounds: Normal heart sounds  No murmur heard  No friction rub  No gallop  Comments: PT ON BB  Pulmonary:      Effort: Pulmonary effort is normal  No respiratory distress  Breath sounds: Normal breath sounds  No stridor  No wheezing, rhonchi or rales  Chest:      Chest wall: No tenderness  Abdominal:      General: Bowel sounds are normal  There is no distension  Palpations: Abdomen is soft  There is no mass  Tenderness: There is no abdominal tenderness  There is no right CVA tenderness, left CVA tenderness, guarding or rebound  Hernia: No hernia is present  Comments: SOFT NT/ND- NO HSM- NO CVA TENDERNESS- NO ASCITES- NO PERITONEAL SIGNS- NO PULSATILE ABD MASS/BRUIT/ TENDERNESS   Musculoskeletal:         General: No swelling, tenderness, deformity or signs of injury  Normal range of motion  Cervical back: Normal range of motion and neck supple  No rigidity or tenderness  Right lower leg: Edema present  Left lower leg: Edema present  Comments: TRACE BLE PRETIBIAL EDEMA- NT- NO ASYM/ ERYTHEMA- EQUAL BILATERAL RADIAL PULSES   Lymphadenopathy:      Cervical: No cervical adenopathy  Skin:     General: Skin is warm        Capillary Refill: Capillary refill takes less than 2 seconds  Coloration: Skin is not jaundiced or pale  Findings: No bruising, erythema, lesion or rash  Neurological:      General: No focal deficit present  Mental Status: He is alert and oriented to person, place, and time  Mental status is at baseline  Cranial Nerves: No cranial nerve deficit  Sensory: No sensory deficit  Motor: No weakness  Coordination: Coordination normal       Gait: Gait normal       Comments: NO NYSTAGMUS- NEG TEST OF SCKEW - NORMAL FINGER TO NOSE- HEEL TO SHIN ALL BILATERALLY    Psychiatric:         Mood and Affect: Mood normal          Behavior: Behavior normal          Thought Content:  Thought content normal          Judgment: Judgment normal          Vital Signs  ED Triage Vitals [05/09/23 1152]   Temperature Pulse Respirations Blood Pressure SpO2   97 9 °F (36 6 °C) (!) 54 18 132/70 99 %      Temp Source Heart Rate Source Patient Position - Orthostatic VS BP Location FiO2 (%)   Oral Monitor Sitting Right arm --      Pain Score       No Pain           Vitals:    05/09/23 1152   BP: 132/70   Pulse: (!) 54   Patient Position - Orthostatic VS: Sitting         Visual Acuity  Visual Acuity    Flowsheet Row Most Recent Value   L Pupil Size (mm) 3   R Pupil Size (mm) 3          ED Medications  Medications - No data to display    Diagnostic Studies  Results Reviewed     Procedure Component Value Units Date/Time    HS Troponin 0hr (reflex protocol) [[id number]]  (Normal) Collected: 05/09/23 1208    Lab Status: Final result Specimen: Blood from Arm, Left Updated: 05/09/23 1301     hs TnI 0hr 8 ng/L     Comprehensive metabolic panel [[id number]]  (Abnormal) Collected: 05/09/23 1208    Lab Status: Final result Specimen: Blood from Arm, Left Updated: 05/09/23 1252     Sodium 140 mmol/L      Potassium 4 2 mmol/L      Chloride 106 mmol/L      CO2 28 mmol/L      ANION GAP 6 mmol/L      BUN 19 mg/dL      Creatinine 1 07 mg/dL      Glucose 94 mg/dL      Calcium 10 1 mg/dL AST 28 U/L      ALT 30 U/L      Alkaline Phosphatase 76 U/L      Total Protein 6 9 g/dL      Albumin 4 5 g/dL      Total Bilirubin 1 18 mg/dL      eGFR 76 ml/min/1 73sq m     Narrative:      [place] guidelines for Chronic Kidney Disease (CKD):   •  Stage 1 with normal or high GFR (GFR > 90 mL/min/1 73 square meters)  •  Stage 2 Mild CKD (GFR = 60-89 mL/min/1 73 square meters)  •  Stage 3A Moderate CKD (GFR = 45-59 mL/min/1 73 square meters)  •  Stage 3B Moderate CKD (GFR = 30-44 mL/min/1 73 square meters)  •  Stage 4 Severe CKD (GFR = 15-29 mL/min/1 73 square meters)  •  Stage 5 End Stage CKD (GFR <15 mL/min/1 73 square meters)  Note: GFR calculation is accurate only with a steady state creatinine    CBC and differential [[id number]] Collected: 05/09/23 1208    Lab Status: Final result Specimen: Blood from Arm, Left Updated: 05/09/23 1230     WBC 5 28 Thousand/uL      RBC 4 94 Million/uL      Hemoglobin 15 6 g/dL      Hematocrit 46 5 %      MCV 94 fL      MCH 31 6 pg      MCHC 33 5 g/dL      RDW 13 1 %      MPV 9 9 fL      Platelets 707 Thousands/uL      nRBC 0 /100 WBCs      Neutrophils Relative 67 %      Immat GRANS % 0 %      Lymphocytes Relative 21 %      Monocytes Relative 9 %      Eosinophils Relative 2 %      Basophils Relative 1 %      Neutrophils Absolute 3 57 Thousands/µL      Immature Grans Absolute 0 02 Thousand/uL      Lymphocytes Absolute 1 10 Thousands/µL      Monocytes Absolute 0 45 Thousand/µL      Eosinophils Absolute 0 09 Thousand/µL      Basophils Absolute 0 05 Thousands/µL                  XR chest 1 view portable    (Results Pending)   CT head without contrast    (Results Pending)              Procedures  Procedures         ED Course  ED Course as of 05/09/23 1411   Tue May 09, 2023   1342 Cxr portable- no old cxr for comparison - normal mediastinum/cardiac silhouette- no free/sq air- no infiltrate- ptx- pulm edema- pleural effusions Medical Decision Making  After h and p-  Er md highly doubts any serious underlying neurologist/cns cause--  Will order head ct non as pt with vascular hx-- neuro exam is completely normal- no head/enck pain like dissections--      Facial tingling: acute illness or injury     Details: see above   Amount and/or Complexity of Data Reviewed  Independent Historian: rory  External Data Reviewed: labs  Details: all previous results-= 9/22 cath report reviewed by er md  Labs: ordered  Details: reviewed by er md   Radiology: ordered and independent interpretation performed  Details: reviewed by er md   ECG/medicine tests: ordered and independent interpretation performed  Details: reviewed by er md  Discussion of management or test interpretation with external provider(s): Moderate amount of er md thought complexity and workup- please see chart and above     Risk  Decision regarding hospitalization  Disposition  Final diagnoses:   None     ED Disposition     None      Follow-up Information    None         Patient's Medications   Discharge Prescriptions    No medications on file       No discharge procedures on file      PDMP Review     None          ED Provider  Electronically Signed by           Jessica Ceja MD  05/09/23 3058

## 2023-05-09 NOTE — DISCHARGE INSTRUCTIONS
DIAGNOSIS; RIGHT MID FACIAL TINGLING    - ACTIVITY AS TOLERATED     - PLEASE LOOK OUT FOR ANY S HINGles type red r sided  facial /scalp rash / any right sided facial weakness- bells palsy --     - please return to  the er for any  new problems with vision/ talking/swallowing /walking or with balance or any  arm/leg weakness

## 2023-05-10 LAB
ATRIAL RATE: 54 BPM
P AXIS: 35 DEGREES
PR INTERVAL: 168 MS
QRS AXIS: 63 DEGREES
QRSD INTERVAL: 82 MS
QT INTERVAL: 430 MS
QTC INTERVAL: 407 MS
T WAVE AXIS: 57 DEGREES
VENTRICULAR RATE: 54 BPM

## 2023-06-14 ENCOUNTER — OFFICE VISIT (OUTPATIENT)
Dept: CARDIOLOGY CLINIC | Facility: CLINIC | Age: 58
End: 2023-06-14
Payer: COMMERCIAL

## 2023-06-14 VITALS
SYSTOLIC BLOOD PRESSURE: 104 MMHG | HEART RATE: 64 BPM | HEIGHT: 71 IN | BODY MASS INDEX: 27.3 KG/M2 | WEIGHT: 195 LBS | DIASTOLIC BLOOD PRESSURE: 60 MMHG

## 2023-06-14 DIAGNOSIS — Z95.5 PRESENCE OF DRUG COATED STENT IN LAD CORONARY ARTERY: ICD-10-CM

## 2023-06-14 DIAGNOSIS — I25.10 CORONARY ARTERY DISEASE INVOLVING NATIVE CORONARY ARTERY OF NATIVE HEART WITHOUT ANGINA PECTORIS: Primary | ICD-10-CM

## 2023-06-14 DIAGNOSIS — I49.9 IRREGULAR HEART RHYTHM: ICD-10-CM

## 2023-06-14 DIAGNOSIS — E78.2 MIXED HYPERLIPIDEMIA: ICD-10-CM

## 2023-06-14 DIAGNOSIS — I25.5 ISCHEMIC CARDIOMYOPATHY: ICD-10-CM

## 2023-06-14 DIAGNOSIS — R73.03 PREDIABETES: ICD-10-CM

## 2023-06-14 PROCEDURE — 93000 ELECTROCARDIOGRAM COMPLETE: CPT | Performed by: INTERNAL MEDICINE

## 2023-06-14 PROCEDURE — 99214 OFFICE O/P EST MOD 30 MIN: CPT | Performed by: INTERNAL MEDICINE

## 2023-06-14 RX ORDER — CARVEDILOL 3.12 MG/1
3.12 TABLET ORAL 2 TIMES DAILY WITH MEALS
Qty: 180 TABLET | Refills: 3 | Status: SHIPPED | OUTPATIENT
Start: 2023-06-14

## 2023-06-14 NOTE — PATIENT INSTRUCTIONS
You were seen today in the Cardiology office for follow up evaluation  Please continue your current cardiac medications as prescribed  Please make the following changes to your cardiac medications:  Continue taking Brilinta 90mg twice daily through September 2023  Starting October 1st 2023, you may stop taking Brilinta  Continue taking Aspirin 81mg once daily  Reduce carvedilol to 3 125mg twice daily  Thank you for choosing [address]  Please call our office or use [employer] with any questions

## 2023-06-14 NOTE — PROGRESS NOTES
[facility]    Sindhu Wright   DOS: 6/14/2023     Chief Complaint:   Chief Complaint   Patient presents with   • Follow-up     6 months, no cardiac sx  HISTORY OF PRESENT ILLNESS:    HPI:  Janell Valdes is a 61 y o  male  He  has a past medical history of CAD (coronary artery disease), Myocardial infarction ([facility] [address] ), Prediabetes, Presence of drug coated stent in LAD coronary artery (09/27/2022), and Unstable angina ([facility] [address] ) (09/2022)  He presents for follow-up evaluation  Last saw me in the office on 12/13/2022  Briefly, this is a 61year old male with ischemic cardiomyopathy in the setting of CAD s/p 2 JOSETTE to his LAD for unstable angina  Today, the patient reports no active cardiopulmonary complaints  Has been physically active and back at work with no limiting symptoms  Specifically, he denies chest pain, shortness of breath, diaphoresis, dizziness, palpitations, orthopnea, edema, syncope, claudication  We discussed his strong family history of heart disease including history of atrial fibrillation and multiple male family members  He is compliant with all recommended medical therapy  ROS    ROS: Pertinent positives and negatives as described in History of Present Illness  Remainder of a 14 point review of systems was negative       Allergies   Allergen Reactions   • Sulfa Antibiotics Hives        Current Outpatient Medications on File Prior to Visit   Medication Sig Dispense Refill   • Aspirin Low Dose 81 MG EC tablet Take 81 mg by mouth daily     • atorvastatin (LIPITOR) 80 mg tablet Take 80 mg by mouth daily at bedtime     • Brilinta 90 MG Take 90 mg by mouth every 12 (twelve) hours     • folic acid (FOLVITE) 1 mg tablet Take 800 mcg by mouth     • glucosamine-chondroitin 500-400 MG tablet Take 1 tablet by mouth     • losartan (COZAAR) 25 mg tablet Take 25 mg by mouth daily     • nitroglycerin (NITROSTAT) 0 4 mg SL tablet Place 1 tablet (0 4 mg total) under the tongue "every 5 (five) minutes as needed for chest pain If no relief with 2 doses, call 911  100 tablet 0   • [DISCONTINUED] carvedilol (COREG) 3 125 mg tablet Take 3 125 mg by mouth 2 (two) times a day with meals 1 tablet BID       No current facility-administered medications on file prior to visit  Past Medical History:   Diagnosis Date   • CAD (coronary artery disease)    • Myocardial infarction [facility])    • Prediabetes    • Presence of drug coated stent in LAD coronary artery 09/27/2022    JOSETTE x 2 to proximal and mid LAD @ [place]  Overlapping  38 and 16mm  • Unstable angina ([facility] [address] ) 09/2022       Past Surgical History:   Procedure Laterality Date   • CHOLECYSTECTOMY     • CORONARY ANGIOPLASTY WITH STENT PLACEMENT     • VASECTOMY         No family history on file      Social History     Socioeconomic History   • Marital status: /Civil Union     Spouse name: Not on file   • Number of children: Not on file   • Years of education: Not on file   • Highest education level: Not on file   Occupational History   • Not on file   Tobacco Use   • Smoking status: Never   • Smokeless tobacco: Never   Vaping Use   • Vaping Use: Never used   Substance and Sexual Activity   • Alcohol use: Yes     Comment: social   • Drug use: Never   • Sexual activity: Not on file   Other Topics Concern   • Not on file   Social History Narrative   • Not on file     Social Determinants of Health     Financial Resource Strain: Not on file   Food Insecurity: Not on file   Transportation Needs: Not on file   Physical Activity: Not on file   Stress: Not on file   Social Connections: Not on file   Intimate Partner Violence: Not on file   Housing Stability: Not on file       OBJECTIVE:    /60 (BP Location: Left arm, Patient Position: Sitting, Cuff Size: Standard)   Pulse 64   Ht 5' 11\" (1 803 m)   Wt 88 5 kg (195 lb)   BMI 27 20 kg/m²      BP Readings from Last 3 Encounters:   06/14/23 104/60   05/09/23 122/71   03/03/23 152/82       Wt " "Readings from Last 3 Encounters:   06/14/23 88 5 kg (195 lb)   05/09/23 87 1 kg (192 lb)   12/27/22 95 7 kg (211 lb)         Physical Exam  Vitals reviewed  Constitutional:       General: He is not in acute distress  Appearance: Normal appearance  He is not diaphoretic  HENT:      Head: Normocephalic and atraumatic  Eyes:      Conjunctiva/sclera: Conjunctivae normal    Neck:      Vascular: No JVD  Cardiovascular:      Rate and Rhythm: Normal rate  Rhythm irregular  Pulses: Normal pulses  Heart sounds: Normal heart sounds  No murmur heard  No friction rub  No gallop  Abdominal:      General: Abdomen is flat  Bowel sounds are normal  There is no distension  Palpations: Abdomen is soft  Musculoskeletal:      Right lower leg: No edema  Left lower leg: No edema  Skin:     General: Skin is warm and dry  Neurological:      General: No focal deficit present  Mental Status: He is alert and oriented to person, place, and time  Psychiatric:         Mood and Affect: Mood normal          Behavior: Behavior normal                                                        Cardiac testing:   EKG reviewed personally as performed in office today  My read: Sinus bradycardia            LABS:  Lab Results   Component Value Date    ALKPHOS 76 05/09/2023    ALT 30 05/09/2023    AST 28 05/09/2023    BUN 19 05/09/2023    CALCIUM 10 1 05/09/2023     05/09/2023    CO2 28 05/09/2023    CREATININE 1 07 05/09/2023    K 4 2 05/09/2023        Lab Results   Component Value Date    HCT 46 5 05/09/2023    HGB 15 6 05/09/2023    MCV 94 05/09/2023     05/09/2023    WBC 5 28 05/09/2023       No results found for: \"CHOL\", \"HDL\", \"LDLCALC\", \"TRIG\"    Lab Results   Component Value Date    HGBA1C 6 0 09/28/2022       No results found for: \"TSH\"      ASSESSMENT/PLAN:   Diagnoses and all orders for this visit:    Coronary artery disease involving native coronary artery of native heart without angina " pectoris  -     carvedilol (COREG) 3 125 mg tablet; Take 1 tablet (3 125 mg total) by mouth 2 (two) times a day with meals 1 tablet BID    Presence of drug coated stent in LAD coronary artery    Ischemic cardiomyopathy  -     carvedilol (COREG) 3 125 mg tablet; Take 1 tablet (3 125 mg total) by mouth 2 (two) times a day with meals 1 tablet BID  -     POCT ECG  -     AMB extended holter monitor; Future    Mixed hyperlipidemia    Prediabetes    Irregular heart rhythm  -     POCT ECG  -     AMB extended holter monitor; Future        Decrease Coreg to 3 125 mg twice daily  Continue losartan 25 mg once daily  Continue aspirin and Brilinta  Brilinta may be discontinued starting October 2023, the patient was advised of this  Patient should continue aspirin lifelong  Continue statin as noted above  Dietary counseling provided given prediabetic state  Given irregularly irregular rhythm noted on exam, I have referred the patient for cardiac patch rhythm monitoring to exclude occult atrial fibrillation given very strong family history of this arrhythmia        Tita Chang MD

## 2023-06-20 ENCOUNTER — TELEPHONE (OUTPATIENT)
Dept: CARDIOLOGY CLINIC | Facility: CLINIC | Age: 58
End: 2023-06-20

## 2023-06-20 NOTE — TELEPHONE ENCOUNTER
No Authorization Required ([id number]) ([id number]) per Nellie Velasquez in [facility] on 6/20/2023 at 1:20pm   #[id number]

## 2023-07-17 ENCOUNTER — CLINICAL SUPPORT (OUTPATIENT)
Dept: CARDIOLOGY CLINIC | Facility: CLINIC | Age: 58
End: 2023-07-17
Payer: COMMERCIAL

## 2023-07-17 DIAGNOSIS — I25.5 ISCHEMIC CARDIOMYOPATHY: ICD-10-CM

## 2023-07-17 DIAGNOSIS — I49.9 IRREGULAR HEART RHYTHM: ICD-10-CM

## 2023-07-17 PROCEDURE — 93248 EXT ECG>7D<15D REV&INTERPJ: CPT | Performed by: INTERNAL MEDICINE

## 2023-09-21 ENCOUNTER — OFFICE VISIT (OUTPATIENT)
Dept: CARDIOLOGY CLINIC | Facility: CLINIC | Age: 58
End: 2023-09-21
Payer: COMMERCIAL

## 2023-09-21 VITALS
BODY MASS INDEX: 26.63 KG/M2 | SYSTOLIC BLOOD PRESSURE: 104 MMHG | HEIGHT: 72 IN | WEIGHT: 196.6 LBS | DIASTOLIC BLOOD PRESSURE: 64 MMHG | HEART RATE: 66 BPM

## 2023-09-21 DIAGNOSIS — Z95.5 PRESENCE OF DRUG COATED STENT IN LAD CORONARY ARTERY: ICD-10-CM

## 2023-09-21 DIAGNOSIS — I25.5 ISCHEMIC CARDIOMYOPATHY: ICD-10-CM

## 2023-09-21 DIAGNOSIS — I49.9 IRREGULAR HEART RHYTHM: ICD-10-CM

## 2023-09-21 DIAGNOSIS — E78.2 MIXED HYPERLIPIDEMIA: ICD-10-CM

## 2023-09-21 DIAGNOSIS — I25.10 CORONARY ARTERY DISEASE INVOLVING NATIVE CORONARY ARTERY OF NATIVE HEART WITHOUT ANGINA PECTORIS: Primary | ICD-10-CM

## 2023-09-21 DIAGNOSIS — I47.19 PAROXYSMAL ATRIAL TACHYCARDIA: ICD-10-CM

## 2023-09-21 PROCEDURE — 99214 OFFICE O/P EST MOD 30 MIN: CPT | Performed by: INTERNAL MEDICINE

## 2023-09-21 NOTE — PROGRESS NOTES
[facility]    Name:Addy Dee   DOS: 9/21/2023     Chief Complaint:   Chief Complaint   Patient presents with   • Follow-up     3 months,    • Dizziness     Occurs with positional changes   • Edema     B/l legs, right tends to be worse        HISTORY OF PRESENT ILLNESS:    HPI:  Roxi Lyle is a 61 y.o. male. He  has a past medical history of CAD (coronary artery disease), Myocardial infarction ([address]), Prediabetes, Presence of drug coated stent in LAD coronary artery (09/27/2022), and Unstable angina ([address]) (09/2022). Briefly, this is a 61year old male with ischemic cardiomyopathy in the setting of CAD s/p 2 JOSETTE to his LAD for unstable angina 9/2022 at hospitals. He presents for follow-up evaluation. He has no active cardiopulmonary complaints, and specifically denies shortness of breath, diaphoresis, dizziness, palpitations, orthopnea, PND, edema, syncope. He did have an episode of isolated longer-lasting chest discomfort at rest that occurred particularly with a stressful moment yesterday. This is never happened since his MI, and has not recurred since that time. The patient did not have to use nitroglycerin. At last office visit, I had referred him for a Adim8 patch XT outpatient cardiac patch rhythm monitor to assess for underlying arrhythmias given irregular rhythm heard on exam.  The patient's monitor demonstrated predominantly normal sinus rhythm with normal heart rate variation. His symptoms did not correlate with any arrhythmia. The study did demonstrate episodic short runs of atrial tachycardia, with clearly evident alternative P wave morphology narrow complex tachycardia. The patient was seemingly asymptomatic with these episodes. He wears a new Apple Watch with ECG capability. Reports that his heart rate trend on his Apple Watch has never alarmed for being high. Additionally, he reports no A-fib alarms on his Apple Watch.     Currently denies any fever, chills, fatigue, new visual changes, lightheadedness, syncope, chest pain, palpitations, shortness of breath at rest or with exertion, orthopnea, PND, nausea, vomiting, diarrhea, dark or bright red blood in stools, lower extremity swelling, leg claudication. ROS    ROS: Pertinent positives and negatives as described in History of Present Illness. Remainder of a 14 point review of systems was negative. Allergies   Allergen Reactions   • Sulfa Antibiotics Hives        Current Outpatient Medications on File Prior to Visit   Medication Sig Dispense Refill   • Aspirin Low Dose 81 MG EC tablet Take 81 mg by mouth daily     • atorvastatin (LIPITOR) 80 mg tablet Take 80 mg by mouth daily at bedtime     • Brilinta 90 MG Take 90 mg by mouth every 12 (twelve) hours     • carvedilol (COREG) 3.125 mg tablet Take 1 tablet (3.125 mg total) by mouth 2 (two) times a day with meals 1 tablet  tablet 3   • folic acid (FOLVITE) 1 mg tablet Take 800 mcg by mouth     • glucosamine-chondroitin 500-400 MG tablet Take 1 tablet by mouth     • losartan (COZAAR) 25 mg tablet Take 25 mg by mouth daily     • nitroglycerin (NITROSTAT) 0.4 mg SL tablet Place 1 tablet (0.4 mg total) under the tongue every 5 (five) minutes as needed for chest pain If no relief with 2 doses, call 911. 100 tablet 0     No current facility-administered medications on file prior to visit. Past Medical History:   Diagnosis Date   • CAD (coronary artery disease)    • Myocardial infarction [facility])    • Prediabetes    • Presence of drug coated stent in LAD coronary artery 09/27/2022    JOSETTE x 2 to proximal and mid LAD @ [facility]. Overlapping. 38 and 16mm. • Unstable angina ([address]) 09/2022       Past Surgical History:   Procedure Laterality Date   • CHOLECYSTECTOMY     • CORONARY ANGIOPLASTY WITH STENT PLACEMENT     • VASECTOMY         No family history on file.     Social History     Socioeconomic History   • Marital status: /Civil Union     Spouse name: Not on file   • Number of children: Not on file   • Years of education: Not on file   • Highest education level: Not on file   Occupational History   • Not on file   Tobacco Use   • Smoking status: Never   • Smokeless tobacco: Never   Vaping Use   • Vaping Use: Never used   Substance and Sexual Activity   • Alcohol use: Yes     Comment: social   • Drug use: Never   • Sexual activity: Not on file   Other Topics Concern   • Not on file   Social History Narrative   • Not on file     Social Determinants of Health     Financial Resource Strain: Not on file   Food Insecurity: Not on file   Transportation Needs: Not on file   Physical Activity: Not on file   Stress: Not on file   Social Connections: Not on file   Intimate Partner Violence: Not on file   Housing Stability: Not on file       OBJECTIVE:    /64 (BP Location: Left arm, Patient Position: Sitting, Cuff Size: Standard)   Pulse 66   Ht 5' 11.75" (1.822 m)   Wt 89.2 kg (196 lb 9.6 oz)   BMI 26.85 kg/m²      BP Readings from Last 3 Encounters:   09/21/23 104/64   06/14/23 104/60   05/09/23 122/71       Wt Readings from Last 3 Encounters:   09/21/23 89.2 kg (196 lb 9.6 oz)   06/14/23 88.5 kg (195 lb)   05/09/23 87.1 kg (192 lb)         Physical Exam  Vitals reviewed. Constitutional:       General: He is not in acute distress. Appearance: Normal appearance. He is not diaphoretic. HENT:      Head: Normocephalic and atraumatic. Eyes:      Conjunctiva/sclera: Conjunctivae normal.   Neck:      Vascular: No JVD. Cardiovascular:      Rate and Rhythm: Normal rate and regular rhythm. Pulses: Normal pulses. Heart sounds: Normal heart sounds. No murmur heard. No friction rub. No gallop. Pulmonary:      Effort: Pulmonary effort is normal.      Breath sounds: Normal breath sounds. No wheezing, rhonchi or rales. Musculoskeletal:      Right lower leg: No edema. Left lower leg: No edema. Skin:     General: Skin is warm and dry. Neurological:      General: No focal deficit present. Mental Status: He is alert and oriented to person, place, and time. Psychiatric:         Mood and Affect: Mood normal.         Behavior: Behavior normal.                                                               LABS:  Lab Results   Component Value Date    BUN 19 05/09/2023    CREATININE 1.07 05/09/2023    CALCIUM 10.1 05/09/2023    K 4.2 05/09/2023    CO2 28 05/09/2023     05/09/2023    ALKPHOS 76 05/09/2023    AST 28 05/09/2023    ALT 30 05/09/2023        Lab Results   Component Value Date    WBC 5.28 05/09/2023    HGB 15.6 05/09/2023    HCT 46.5 05/09/2023    MCV 94 05/09/2023     05/09/2023       No results found for: "CHOL", "HDL", "LDLCALC", "TRIG"    Lab Results   Component Value Date    HGBA1C 6.0 09/28/2022       ASSESSMENT/PLAN:  Diagnoses and all orders for this visit:    Coronary artery disease involving native coronary artery of native heart without angina pectoris  -     Lipid Panel With Direct LDL; Future    Presence of drug coated stent in LAD coronary artery    Ischemic cardiomyopathy    Irregular heart rhythm    Paroxysmal atrial tachycardia (HCC)    Mixed hyperlipidemia  -     Lipid Panel With Direct LDL; Future      50-year-old male presenting for follow-up evaluation. He has no new active cardiopulmonary complaints. I suspect that the chest pain isolated episode that he experienced yesterday was related to stress, not anginal in etiology. Patient advised to continue monitoring symptoms, notify me if he has recurrence. He will continue aspirin 81 mg once daily. Patient was advised that he may discontinue Brilinta after the end of next week which will luca 12 months since placement of his drug-eluting stents to the proximal to mid LAD.   He will continue atorvastatin 80 mg once daily, and have his lipids repeated now as he was on no lipid-lowering therapy at the time of his MI and his cholesterol was not far from goal without medication. In regards to his cardiomyopathy, he now has recovered LVEF. I recommended continuing Coreg and losartan as noted above. 1 year follow up. Марина Kat MD      Portions of the record may have been created with voice recognition software. Occasional wrong word or "sound alike" substitutions may have occurred due to the inherent limitations of voice recognition software. Please review the chart carefully and recognize, using context, where substitutions/typographical errors may have occurred.

## 2023-09-21 NOTE — PATIENT INSTRUCTIONS
You were seen today in the Cardiology office for follow up evaluation. Please continue your current cardiac medications as prescribed with the following changes:    After 1 week from today's office visit, you may discontinue Brilinta. Continue Aspirin 81mg once daily. Thank you for choosing [facility]. Please call our office or use [facility] with any questions.

## 2023-09-25 ENCOUNTER — APPOINTMENT (OUTPATIENT)
Dept: LAB | Facility: HOSPITAL | Age: 58
End: 2023-09-25
Payer: COMMERCIAL

## 2023-09-25 DIAGNOSIS — I25.10 CORONARY ARTERY DISEASE INVOLVING NATIVE CORONARY ARTERY OF NATIVE HEART WITHOUT ANGINA PECTORIS: ICD-10-CM

## 2023-09-25 DIAGNOSIS — E78.2 MIXED HYPERLIPIDEMIA: ICD-10-CM

## 2023-09-25 LAB
CHOLEST SERPL-MCNC: 140 MG/DL
HDLC SERPL-MCNC: 67 MG/DL
LDLC SERPL CALC-MCNC: 48 MG/DL (ref 0–100)
LDLC SERPL DIRECT ASSAY-MCNC: 56 MG/DL (ref 0–100)
NONHDLC SERPL-MCNC: 73 MG/DL
TRIGL SERPL-MCNC: 124 MG/DL

## 2023-09-25 PROCEDURE — 83721 ASSAY OF BLOOD LIPOPROTEIN: CPT

## 2023-09-25 PROCEDURE — 80061 LIPID PANEL: CPT

## 2023-09-25 PROCEDURE — 36415 COLL VENOUS BLD VENIPUNCTURE: CPT

## 2023-09-27 ENCOUNTER — TELEPHONE (OUTPATIENT)
Dept: CARDIOLOGY CLINIC | Facility: CLINIC | Age: 58
End: 2023-09-27

## 2023-09-27 DIAGNOSIS — I25.10 CORONARY ARTERY DISEASE INVOLVING NATIVE CORONARY ARTERY OF NATIVE HEART WITHOUT ANGINA PECTORIS: Primary | ICD-10-CM

## 2023-09-27 RX ORDER — LOSARTAN POTASSIUM 25 MG/1
25 TABLET ORAL DAILY
Qty: 30 TABLET | Refills: 3 | Status: SHIPPED | OUTPATIENT
Start: 2023-09-27

## 2023-09-27 NOTE — TELEPHONE ENCOUNTER
Losartan 25 mg needs to be refilled. He is Dr. Maik Smith patient and needs this refilled. It was initially ordered by his previous Dr. Neal Early he no longer goes to.

## 2023-10-05 ENCOUNTER — TELEPHONE (OUTPATIENT)
Dept: CARDIOLOGY CLINIC | Facility: CLINIC | Age: 58
End: 2023-10-05

## 2023-10-05 NOTE — TELEPHONE ENCOUNTER
Patient called asking for a refill of his atorvastatin 80 mg. He also says that he just had his lipids done about a week ago and he was told by the doctor that his dose may be decreased based on the results of the lipids. So, he needs to know if the dose is going to be reduced and he needs a refill either way sent to the  [address] [place] [address], he is down to 1 pill.

## 2023-10-09 DIAGNOSIS — E78.2 MIXED HYPERLIPIDEMIA: Primary | ICD-10-CM

## 2023-10-09 RX ORDER — ATORVASTATIN CALCIUM 80 MG/1
80 TABLET, FILM COATED ORAL
Qty: 90 TABLET | Refills: 3 | Status: SHIPPED | OUTPATIENT
Start: 2023-10-09

## 2023-10-09 NOTE — TELEPHONE ENCOUNTER
Notified patient to stay on same dose of statin per Dr. Sherine Cabrera, and also sent in refill to pharmacy.  Patient verbalized understanding

## 2024-01-21 DIAGNOSIS — I25.10 CORONARY ARTERY DISEASE INVOLVING NATIVE CORONARY ARTERY OF NATIVE HEART WITHOUT ANGINA PECTORIS: ICD-10-CM

## 2024-01-22 RX ORDER — LOSARTAN POTASSIUM 25 MG/1
25 TABLET ORAL DAILY
Qty: 30 TABLET | Refills: 11 | Status: SHIPPED | OUTPATIENT
Start: 2024-01-22

## 2024-02-19 ENCOUNTER — NURSE TRIAGE (OUTPATIENT)
Dept: PHYSICAL THERAPY | Facility: OTHER | Age: 59
End: 2024-02-19

## 2024-02-19 ENCOUNTER — TELEPHONE (OUTPATIENT)
Age: 59
End: 2024-02-19

## 2024-02-19 DIAGNOSIS — M54.50 ACUTE EXACERBATION OF CHRONIC LOW BACK PAIN: Primary | ICD-10-CM

## 2024-02-19 DIAGNOSIS — G89.29 ACUTE EXACERBATION OF CHRONIC LOW BACK PAIN: Primary | ICD-10-CM

## 2024-02-19 NOTE — TELEPHONE ENCOUNTER
Additional Information   Negative: Has the patient had unexplained weight loss?   Negative: Does the patient have a fever?   Negative: Is the patient experiencing blood in sputum?   Affirmative: Is this a chronic condition?   Negative: Is the patient experiencing urine retention?   Negative: Is the patient experiencing acute drop foot or paralysis?   Negative: Has the patient experienced major trauma? (fall from height, high speed collision, direct blow to spine) and is also experiencing nausea, light-headedness, or loss of consciousness?    Protocols used: [facility] Protocol    This RN did review in detail the [facility] and what we can provide for their back pain.  Patient is agreeable to being triaged by this RN and would like to proceed with Physical Therapy.    Referral was placed for Physical Therapy at the [place] site. Patients information was sent to the  to make evaluation appointment. Patient made aware that the PT office  will be calling to schedule the appointment.  Patient was provided with the phone number to the PT office.    No further questions and/or concerns were voiced by the patient at this time. Patient states understanding of the referral that was placed.

## 2024-02-19 NOTE — TELEPHONE ENCOUNTER
Caller: Addy LAINEZ    Doctor: N/A    Reason for call: Pt called in with some back issues , no images , ot has never seen pain mgt before. Transferred the call to the [facility]     Call back#: N/A

## 2024-02-19 NOTE — TELEPHONE ENCOUNTER
Caller: preeti Daly    Doctor: n/a    Reason for call: pt called back after waiting on hold for [facility] for an hour and a half.  I gave pt direct phone number and transferred again.  Call was answered    Call back#: n/a

## 2024-02-19 NOTE — TELEPHONE ENCOUNTER
Additional Information   Negative: Is this related to a work injury?   Negative: Is this related to an MVA?   Negative: Are you currently recieving homecare services?    Background - Initial Assessment  Clinical complaint: Pain is bilat low back, no current radiation into legs. No numbness or tingling. Started 10 days ago. Was lifting boxes in the morning (no pain) later same day bent over and felt a twinge. Had pain then. Pain is positional, states he can find a comfortable position sometimes. Bent over or laying down helps. Anything in between is worse. Pt states no prior back surgery, but a HX of back pain. Was in PT and saw Ortho 15 years ago. Pain is constant, feels stabbing, and electric shock like. Pt called PM to schedule and was transferred to [facility] spine. No new work up or recent imaging  Date of onset: 10 days  Frequency of pain: constant  Quality of pain: stabbing, and electric shock like    Protocols used: [facility] Protocol

## 2024-02-21 ENCOUNTER — EVALUATION (OUTPATIENT)
Dept: PHYSICAL THERAPY | Facility: CLINIC | Age: 59
End: 2024-02-21
Payer: COMMERCIAL

## 2024-02-21 VITALS
OXYGEN SATURATION: 98 % | DIASTOLIC BLOOD PRESSURE: 92 MMHG | SYSTOLIC BLOOD PRESSURE: 130 MMHG | TEMPERATURE: 97.3 F | HEART RATE: 71 BPM

## 2024-02-21 DIAGNOSIS — M54.50 ACUTE EXACERBATION OF CHRONIC LOW BACK PAIN: Primary | ICD-10-CM

## 2024-02-21 DIAGNOSIS — G89.29 ACUTE EXACERBATION OF CHRONIC LOW BACK PAIN: Primary | ICD-10-CM

## 2024-02-21 PROCEDURE — 97110 THERAPEUTIC EXERCISES: CPT | Performed by: PHYSICAL THERAPIST

## 2024-02-21 PROCEDURE — 97162 PT EVAL MOD COMPLEX 30 MIN: CPT | Performed by: PHYSICAL THERAPIST

## 2024-02-21 NOTE — LETTER
2024    Reilly Manuel MD  [address] [place] [address]  [address]  [facility] [address]    Patient: Addy Rich  YOB: 1965   Date of Visit: 2024      Dear Dr. Manuel:    One of your patients, Addy Rich, was referred to me by the [facility] Spine program.  Please see the evaluation summary attached below.  If care is required beyond 30 days to help your patient reach their goals, I will send you a request for signature on the plan of care.    If you have any questions or concerns, please don't hesitate to call.      Sincerely,    Titus Ching, PT      Primary Care Provider:      I certify that I have read the below Plan of Care and certify the need for these services furnished under this plan of treatment while under my care.                    Reilly Manuel MD  [address] [place] [address]  [facility] [address]  Via Fax: [phone]           PT Evaluation     Today's date: 2024  Patient name: Addy Rich  : 1965  MRN: [id number]  Referring provider: Kobe Higuera, PT  Dx:   Encounter Diagnosis     ICD-10-CM    1. Acute exacerbation of chronic low back pain  M54.50     G89.29                      Assessment  Assessment details: Addy Rich is a 58 y.o. male who presents with increased low back pain consistent with referring diagnosis of Acute exacerbation of chronic low back pain  (primary encounter diagnosis) that is moderately complex secondary to mechanical onset, moderate fear avoidance and pain levels.  Clinically demonstrates signs and symptoms consistent with lumbar instability secondary to + prone instability testing and noted pelvic obliquity decreased lumbar ROM, decreased core and hip strength, decreased postural proprioception leading to pain with ADLs and exercise.  This suggests the need for skilled OPPT to address the above listed impairments, achieve established goals and return to PLOF pain-free.  If you have any questions or concerns please  contact me at [phone].  Thank you!     Impairments: abnormal coordination, abnormal muscle firing, abnormal muscle tone, abnormal or restricted ROM, activity intolerance, impaired balance, lacks appropriate home exercise program, pain with function, safety issue, weight-bearing intolerance and poor body mechanics    Symptom irritability: moderateUnderstanding of Dx/Px/POC: good   Prognosis: good    Goals  Short Term Goals (4 weeks)  1.) Establish independence with HEP  2.) Decrease subjective pain levels from NPRS at least to 2-5/10 at rest and with activity  3.) Improve L/S ROM at least 5-10 degrees into all planes to allow for improved ease of movement with less guarding    Long Term Goals (8 weeks)  1.) Improve L/S ROM to WNL in all planes to restore normal movement with ADLs and function  2.) Improve B/L hip ABD and ER strength to 5/5 in all planes in order to return to pain-free ADLs and function  3.) Improve FOTO score at least to 75 points showing improved self reported disability        Plan  Plan details: Initiate POC for L/S stability; monitor sxs and progress as able.   Patient would benefit from: PT eval and skilled physical therapy  Planned modality interventions: biofeedback, cryotherapy, electrical stimulation/Russian stimulation and TENS  Planned therapy interventions: abdominal trunk stabilization, activity modification, ADL retraining, ADL training, balance, balance/weight bearing training, behavior modification, body mechanics training, coordination, flexibility, functional ROM exercises, gait training, graded activity, graded exercise, graded motor, home exercise program, IADL retraining, therapeutic exercise, therapeutic activities, therapeutic training, transfer training, strengthening, stretching, sensory integrative techniques, self care, IASTM, joint mobilization, kinesiology taping, manual therapy, motor coordination training, nerve gliding, neuromuscular re-education, patient  education and postural training  Frequency: 2x week  Duration in visits: 16  Duration in weeks: 8  Plan of Care beginning date: 2024  Plan of Care expiration date: 2024  Treatment plan discussed with: patient        Subjective Evaluation    History of Present Illness  Date of onset: 2024  Mechanism of injury: Addy Rihc is a 58 y.o. male who presents with increased R sided LBP starting ~2 weeks ago with moving boxes earlier in the day leading to eventual pain a few hours later after driving for 1.5 hours and eventual sharp electric pain at R side.  Notes he tried to work through his pain.   Decided to seek out MD consult where referral for comprehensive spine was provided and script for OPPT provided.  Denies night pain only pain with transitions.  Denies changes in bowel or bladder function.  Denies any NT signs or sxs.  F/U with MD in 1 month.  Wishes to return to PLOF pain-free; figure out how to reduce the recurrence of back pain.               Recurrent probem    Quality of life: good    Patient Goals  Patient goals for therapy: decreased edema, decreased pain, improved balance, increased motion, increased strength, independence with ADLs/IADLs and return to sport/leisure activities  Patient goal: Return to PLOF pain-free  Pain  Current pain ratin  At best pain ratin  At worst pain rating: 10  Location: L/S  Quality: sharp, dull ache and cramping  Relieving factors: support and relaxation  Aggravating factors: lifting, standing and sitting  Progression: improved    Social Support  Steps to enter house: yes  6  Stairs in house: yes   13  Lives in: one-story house  Lives with: adult children and spouse    Employment status: not working ([employer])  Exercise history: Rowing machine and 15 mins on TM 2-3 days a week  Life stress: Moderate      Diagnostic Tests  No diagnostic tests performed  Treatments  Previous treatment: physical therapy  Current treatment: physical therapy        Objective      Neurological Testing     Sensation     Lumbar   Left   Intact: light touch    Right   Intact: light touch    Reflexes   Left   Patellar (L4): normal (2+)  Achilles (S1): normal (2+)  Clonus sign: negative    Right   Patellar (L4): normal (2+)  Achilles (S1): normal (2+)  Clonus sign: negative    Active Range of Motion     Lumbar   Flexion: 110 degrees   Extension: 2 degrees   Left lateral flexion: 26 degrees     Right lateral flexion: 17 degrees   Left rotation: 56 degrees   Right rotation: 57 degrees     Additional Active Range of Motion Details  Hip ER R 22 degrees vs L 25 degrees   Hip IR R 50; L 62 degrees     Strength/Myotome Testing     Lumbar   Left   Heel walk: normal  Toe walk: normal    Right   Heel walk: normal  Toe walk: normal    Left Hip   Planes of Motion   Flexion: 4+  Extension: 4  Abduction: 5  Adduction: 5  External rotation: 5  Internal rotation: 5    Right Hip   Planes of Motion   Flexion: 4  Extension: 4+  Abduction: 4  Adduction: 5  External rotation: 5  Internal rotation: 5    Left Knee   Flexion: 5  Extension: 5    Right Knee   Flexion: 5  Extension: 5    Left Ankle/Foot   Dorsiflexion: 5  Plantar flexion: 5    Right Ankle/Foot   Dorsiflexion: 5  Plantar flexion: 5    Tests   Pain with max opening      Lumbar     Left   Negative crossed SLR, passive SLR, quadrant and slump test.     Right   Negative crossed SLR, passive SLR, quadrant and slump test.     Left Pelvic Girdle/Sacrum   Negative: active SLR test.     Right Pelvic Girdle/Sacrum   Negative: active SLR test.     Functional Assessment      Squat    Left within functional limits and right within functional limits.     Single Leg Stance   Left: 30 seconds  Right: 20 seconds             Precautions: CAD, prediabetes, ischemic cardiomyopathy  EPOC: 4/24/24  HEP: Access Code: [email or web address]  URL: [email or web address]  Date: 02/21/2024  Prepared by: Titus Ching    Exercises  - Cat to Child's Pose with Posterior Pelvic Tilt  - 1 x  daily - 7 x weekly - 3 sets - 10 reps  - Single Leg Bridge  - 1 x daily - 7 x weekly - 3 sets - 10 reps  - Sidelying Hip Abduction  - 1 x daily - 7 x weekly - 3 sets - 10 reps  - Supine Active Straight Leg Raise  - 1 x daily - 7 x weekly - 3 sets - 10 reps  - Bird Dog  - 1 x daily - 7 x weekly - 3 sets - 10 reps    Manuals 2/21            L/S CPA                                                    Neuro Re-Ed             NSP             NSP heel slide                                                                              Ther Ex             YING             Quadriped Alt UE/LE 10x             SL bridge 10x             Hip ABD 10x ea            SLR 10x ea             Cat/cow                                       Ther Activity                                       Gait Training                                       Modalities

## 2024-02-21 NOTE — PROGRESS NOTES
PT Evaluation     Today's date: 2024  Patient name: Addy Rich  : 1965  MRN: [id number]  Referring provider: Kobe Higuera, FATOU  Dx:   Encounter Diagnosis     ICD-10-CM    1. Acute exacerbation of chronic low back pain  M54.50     G89.29                      Assessment  Assessment details: Addy Rich is a 58 y.o. male who presents with increased low back pain consistent with referring diagnosis of Acute exacerbation of chronic low back pain  (primary encounter diagnosis) that is moderately complex secondary to mechanical onset, moderate fear avoidance and pain levels.  Clinically demonstrates signs and symptoms consistent with lumbar instability secondary to + prone instability testing and noted pelvic obliquity decreased lumbar ROM, decreased core and hip strength, decreased postural proprioception leading to pain with ADLs and exercise.  This suggests the need for skilled OPPT to address the above listed impairments, achieve established goals and return to PLOF pain-free.  If you have any questions or concerns please contact me at [phone].  Thank you!     Impairments: abnormal coordination, abnormal muscle firing, abnormal muscle tone, abnormal or restricted ROM, activity intolerance, impaired balance, lacks appropriate home exercise program, pain with function, safety issue, weight-bearing intolerance and poor body mechanics    Symptom irritability: moderateUnderstanding of Dx/Px/POC: good   Prognosis: good    Goals  Short Term Goals (4 weeks)  1.) Establish independence with HEP  2.) Decrease subjective pain levels from NPRS at least to 2-5/10 at rest and with activity  3.) Improve L/S ROM at least 5-10 degrees into all planes to allow for improved ease of movement with less guarding    Long Term Goals (8 weeks)  1.) Improve L/S ROM to WNL in all planes to restore normal movement with ADLs and function  2.) Improve B/L hip ABD and ER strength to 5/5 in all planes in order to return to  pain-free ADLs and function  3.) Improve FOTO score at least to 75 points showing improved self reported disability        Plan  Plan details: Initiate POC for L/S stability; monitor sxs and progress as able.   Patient would benefit from: PT eval and skilled physical therapy  Planned modality interventions: biofeedback, cryotherapy, electrical stimulation/Russian stimulation and TENS  Planned therapy interventions: abdominal trunk stabilization, activity modification, ADL retraining, ADL training, balance, balance/weight bearing training, behavior modification, body mechanics training, coordination, flexibility, functional ROM exercises, gait training, graded activity, graded exercise, graded motor, home exercise program, IADL retraining, therapeutic exercise, therapeutic activities, therapeutic training, transfer training, strengthening, stretching, sensory integrative techniques, self care, IASTM, joint mobilization, kinesiology taping, manual therapy, motor coordination training, nerve gliding, neuromuscular re-education, patient education and postural training  Frequency: 2x week  Duration in visits: 16  Duration in weeks: 8  Plan of Care beginning date: 2/21/2024  Plan of Care expiration date: 4/24/2024  Treatment plan discussed with: patient        Subjective Evaluation    History of Present Illness  Date of onset: 2/8/2024  Mechanism of injury: Addy Rich is a 58 y.o. male who presents with increased R sided LBP starting ~2 weeks ago with moving boxes earlier in the day leading to eventual pain a few hours later after driving for 1.5 hours and eventual sharp electric pain at R side.  Notes he tried to work through his pain.   Decided to seek out MD consult where referral for comprehensive spine was provided and script for OPPT provided.  Denies night pain only pain with transitions.  Denies changes in bowel or bladder function.  Denies any NT signs or sxs.  F/U with MD in 1 month.  Wishes to return to OF  pain-free; figure out how to reduce the recurrence of back pain.               Recurrent probem    Quality of life: good    Patient Goals  Patient goals for therapy: decreased edema, decreased pain, improved balance, increased motion, increased strength, independence with ADLs/IADLs and return to sport/leisure activities  Patient goal: Return to PLOF pain-free  Pain  Current pain ratin  At best pain ratin  At worst pain rating: 10  Location: L/S  Quality: sharp, dull ache and cramping  Relieving factors: support and relaxation  Aggravating factors: lifting, standing and sitting  Progression: improved    Social Support  Steps to enter house: yes  6  Stairs in house: yes   13  Lives in: one-story house  Lives with: adult children and spouse    Employment status: not working ([employer])  Exercise history: Rowing machine and 15 mins on TM 2-3 days a week  Life stress: Moderate      Diagnostic Tests  No diagnostic tests performed  Treatments  Previous treatment: physical therapy  Current treatment: physical therapy        Objective     Neurological Testing     Sensation     Lumbar   Left   Intact: light touch    Right   Intact: light touch    Reflexes   Left   Patellar (L4): normal (2+)  Achilles (S1): normal (2+)  Clonus sign: negative    Right   Patellar (L4): normal (2+)  Achilles (S1): normal (2+)  Clonus sign: negative    Active Range of Motion     Lumbar   Flexion: 110 degrees   Extension: 2 degrees   Left lateral flexion: 26 degrees     Right lateral flexion: 17 degrees   Left rotation: 56 degrees   Right rotation: 57 degrees     Additional Active Range of Motion Details  Hip ER R 22 degrees vs L 25 degrees   Hip IR R 50; L 62 degrees     Strength/Myotome Testing     Lumbar   Left   Heel walk: normal  Toe walk: normal    Right   Heel walk: normal  Toe walk: normal    Left Hip   Planes of Motion   Flexion: 4+  Extension: 4  Abduction: 5  Adduction: 5  External rotation: 5  Internal rotation: 5    Right Hip    Planes of Motion   Flexion: 4  Extension: 4+  Abduction: 4  Adduction: 5  External rotation: 5  Internal rotation: 5    Left Knee   Flexion: 5  Extension: 5    Right Knee   Flexion: 5  Extension: 5    Left Ankle/Foot   Dorsiflexion: 5  Plantar flexion: 5    Right Ankle/Foot   Dorsiflexion: 5  Plantar flexion: 5    Tests   Pain with max opening      Lumbar     Left   Negative crossed SLR, passive SLR, quadrant and slump test.     Right   Negative crossed SLR, passive SLR, quadrant and slump test.     Left Pelvic Girdle/Sacrum   Negative: active SLR test.     Right Pelvic Girdle/Sacrum   Negative: active SLR test.     Functional Assessment      Squat    Left within functional limits and right within functional limits.     Single Leg Stance   Left: 30 seconds  Right: 20 seconds             Precautions: CAD, prediabetes, ischemic cardiomyopathy  EPOC: 4/24/24  HEP: Access Code: [email or web address]  URL: [email or web address]  Date: 02/21/2024  Prepared by: Titus Ching    Exercises  - Cat to Child's Pose with Posterior Pelvic Tilt  - 1 x daily - 7 x weekly - 3 sets - 10 reps  - Single Leg Bridge  - 1 x daily - 7 x weekly - 3 sets - 10 reps  - Sidelying Hip Abduction  - 1 x daily - 7 x weekly - 3 sets - 10 reps  - Supine Active Straight Leg Raise  - 1 x daily - 7 x weekly - 3 sets - 10 reps  - Bird Dog  - 1 x daily - 7 x weekly - 3 sets - 10 reps    Manuals 2/21            L/S CPA                                                    Neuro Re-Ed             NSP             NSP heel slide                                                                              Ther Ex             YING             Quadriped Alt UE/LE 10x             SL bridge 10x             Hip ABD 10x ea            SLR 10x ea             Cat/cow                                       Ther Activity                                       Gait Training                                       Modalities

## 2024-03-06 ENCOUNTER — OFFICE VISIT (OUTPATIENT)
Dept: PHYSICAL THERAPY | Facility: CLINIC | Age: 59
End: 2024-03-06
Payer: COMMERCIAL

## 2024-03-06 DIAGNOSIS — G89.29 ACUTE EXACERBATION OF CHRONIC LOW BACK PAIN: Primary | ICD-10-CM

## 2024-03-06 DIAGNOSIS — M54.50 ACUTE EXACERBATION OF CHRONIC LOW BACK PAIN: Primary | ICD-10-CM

## 2024-03-06 PROCEDURE — 97110 THERAPEUTIC EXERCISES: CPT | Performed by: PHYSICAL THERAPIST

## 2024-03-06 PROCEDURE — 97140 MANUAL THERAPY 1/> REGIONS: CPT | Performed by: PHYSICAL THERAPIST

## 2024-03-06 NOTE — PROGRESS NOTES
"Daily Note     Today's date: 3/6/2024  Patient name: Addy Rich  : 1965  MRN: [id number]  Referring provider: Kobe Higuera PT  Dx:   Encounter Diagnosis     ICD-10-CM    1. Acute exacerbation of chronic low back pain  M54.50     G89.29                      Subjective: Compliant with HEP, still with discomfort       Objective: See treatment diary below      Assessment: Still with slight lumbar instability presentation noted with CPA to sacrum and L/S.  Noting continued fear and apprehension with performance of added RDL and deadlift with cues for straight lumbar lordosis.  No onset of pain or instability t/o session.  Fair ability to perform TA activation with added NSP marching and 90/ heel slides and leg lift.  Reviewed proper lifting mechanics of TA activation with lumbar extension.     Plan: Continue per plan of care.      Precautions: CAD, prediabetes, ischemic cardiomyopathy  EPOC: 24  HEP: Access Code: [email or web address]  URL: [email or web address]  Date: 2024  Prepared by: Titus Ching    Exercises  - Cat to Child's Pose with Posterior Pelvic Tilt  - 1 x daily - 7 x weekly - 3 sets - 10 reps  - Single Leg Bridge  - 1 x daily - 7 x weekly - 3 sets - 10 reps  - Sidelying Hip Abduction  - 1 x daily - 7 x weekly - 3 sets - 10 reps  - Supine Active Straight Leg Raise  - 1 x daily - 7 x weekly - 3 sets - 10 reps  - Bird Dog  - 1 x daily - 7 x weekly - 3 sets - 10 reps    Manuals  3/6           L/S CPA  PF 10 min            Sacral slide and rocking  5 min                                      Neuro Re-Ed             NSP  10x2\"           NSP heel slide  20x           90/90 march  BP cuff 20x           90/90 bicycle  BP cuff 20x                                                  Ther Ex             YING             Quadriped Alt UE/LE 10x  20x           SL bridge 10x  10x           Hip ABD 10x ea            SLR 10x ea             Nick pose Cat/cow  3x10\"           RDL  20# KB 20x         "   Keith  20# KB 20x           Ther Activity                                       Gait Training                                       Modalities

## 2024-03-13 ENCOUNTER — OFFICE VISIT (OUTPATIENT)
Dept: PHYSICAL THERAPY | Facility: CLINIC | Age: 59
End: 2024-03-13
Payer: COMMERCIAL

## 2024-03-13 DIAGNOSIS — G89.29 ACUTE EXACERBATION OF CHRONIC LOW BACK PAIN: Primary | ICD-10-CM

## 2024-03-13 DIAGNOSIS — M54.50 ACUTE EXACERBATION OF CHRONIC LOW BACK PAIN: Primary | ICD-10-CM

## 2024-03-13 PROCEDURE — 97530 THERAPEUTIC ACTIVITIES: CPT | Performed by: PHYSICAL THERAPIST

## 2024-03-13 PROCEDURE — 97110 THERAPEUTIC EXERCISES: CPT | Performed by: PHYSICAL THERAPIST

## 2024-03-13 NOTE — PROGRESS NOTES
"Daily Note     Today's date: 3/13/2024  Patient name: Addy Rich  : 1965  MRN: [id number]  Referring provider: Kobe Higuera PT  Dx:   Encounter Diagnosis     ICD-10-CM    1. Acute exacerbation of chronic low back pain  M54.50     G89.29                      Subjective: Feeling better overall, compliant with HEP.        Objective: See treatment diary below      Assessment: No difficulty with RDL or deadlifting without any peripheralization.  Able to initiate performance of planks and side planks without difficulty.  Good HS length B/L and (-) SLR signs. Hip ABD strength now 5/5 L side and 5-/5 on R.  Will re-assess next session.  No difficulty with lifting mechanics.  Pt feels he is able to continue independently.          Plan: Continue per plan of care.      Precautions: CAD, prediabetes, ischemic cardiomyopathy  EPOC: 24  HEP: Access Code: [email or web address]  URL: [email or web address]  Date: 2024  Prepared by: Titus Ching    Exercises  - Cat to Child's Pose with Posterior Pelvic Tilt  - 1 x daily - 7 x weekly - 3 sets - 10 reps  - Single Leg Bridge  - 1 x daily - 7 x weekly - 3 sets - 10 reps  - Sidelying Hip Abduction  - 1 x daily - 7 x weekly - 3 sets - 10 reps  - Supine Active Straight Leg Raise  - 1 x daily - 7 x weekly - 3 sets - 10 reps  - Bird Dog  - 1 x daily - 7 x weekly - 3 sets - 10 reps    Manuals 2/21 3/6 3/13          L/S CPA  PF 10 min            Sacral slide and rocking  5 min                                      Neuro Re-Ed             NSP  10x2\"           NSP heel slide  20x           90/90 march  BP cuff 20x 10x          90/90 bicycle  BP cuff 20x 10x          90/90 leg lift   10x                                    Ther Ex             YING             Quadriped Alt UE/LE 10x  20x 20x          SL bridge 10x  10x           Hip ABD 10x ea  MMT B/L           SLR 10x ea             Nick pose Cat/cow  3x10\" 3x10\"          RDL  20# KB 20x 20# 10x2          Planks and side " "planks   3x15\" ea           Deadlift  20# KB 20x 20# 2x10          Ther Activity             Lifting mechanics   10 min                        Gait Training                                       Modalities                                              "

## 2024-03-20 ENCOUNTER — APPOINTMENT (OUTPATIENT)
Dept: PHYSICAL THERAPY | Facility: CLINIC | Age: 59
End: 2024-03-20
Payer: COMMERCIAL

## 2024-03-27 ENCOUNTER — APPOINTMENT (OUTPATIENT)
Dept: PHYSICAL THERAPY | Facility: CLINIC | Age: 59
End: 2024-03-27
Payer: COMMERCIAL

## 2024-06-08 DIAGNOSIS — I25.5 ISCHEMIC CARDIOMYOPATHY: ICD-10-CM

## 2024-06-08 DIAGNOSIS — I25.10 CORONARY ARTERY DISEASE INVOLVING NATIVE CORONARY ARTERY OF NATIVE HEART WITHOUT ANGINA PECTORIS: ICD-10-CM

## 2024-06-09 RX ORDER — CARVEDILOL 3.12 MG/1
3.12 TABLET ORAL 2 TIMES DAILY WITH MEALS
Qty: 180 TABLET | Refills: 0 | Status: SHIPPED | OUTPATIENT
Start: 2024-06-09

## 2024-09-04 DIAGNOSIS — I25.10 CORONARY ARTERY DISEASE INVOLVING NATIVE CORONARY ARTERY OF NATIVE HEART WITHOUT ANGINA PECTORIS: ICD-10-CM

## 2024-09-04 DIAGNOSIS — I25.5 ISCHEMIC CARDIOMYOPATHY: ICD-10-CM

## 2024-09-04 RX ORDER — CARVEDILOL 3.12 MG/1
3.12 TABLET ORAL 2 TIMES DAILY WITH MEALS
Qty: 14 TABLET | Refills: 0 | Status: SHIPPED | OUTPATIENT
Start: 2024-09-04

## 2024-09-23 ENCOUNTER — OFFICE VISIT (OUTPATIENT)
Dept: CARDIOLOGY CLINIC | Facility: CLINIC | Age: 59
End: 2024-09-23
Payer: COMMERCIAL

## 2024-09-23 VITALS
HEART RATE: 57 BPM | WEIGHT: 214 LBS | DIASTOLIC BLOOD PRESSURE: 74 MMHG | HEIGHT: 71 IN | SYSTOLIC BLOOD PRESSURE: 125 MMHG | BODY MASS INDEX: 29.96 KG/M2

## 2024-09-23 DIAGNOSIS — Z95.5 PRESENCE OF DRUG COATED STENT IN LAD CORONARY ARTERY: ICD-10-CM

## 2024-09-23 DIAGNOSIS — I25.10 CORONARY ARTERY DISEASE INVOLVING NATIVE CORONARY ARTERY OF NATIVE HEART WITHOUT ANGINA PECTORIS: Primary | ICD-10-CM

## 2024-09-23 DIAGNOSIS — I47.19 PAROXYSMAL ATRIAL TACHYCARDIA (HCC): ICD-10-CM

## 2024-09-23 DIAGNOSIS — E78.2 MIXED HYPERLIPIDEMIA: ICD-10-CM

## 2024-09-23 DIAGNOSIS — I21.02 MYOCARDIAL INFARCTION INVOLVING LEFT ANTERIOR DESCENDING (LAD) CORONARY ARTERY, UNSPECIFIED MI TYPE (HCC): ICD-10-CM

## 2024-09-23 DIAGNOSIS — I25.5 ISCHEMIC CARDIOMYOPATHY: ICD-10-CM

## 2024-09-23 PROCEDURE — 93000 ELECTROCARDIOGRAM COMPLETE: CPT | Performed by: INTERNAL MEDICINE

## 2024-09-23 PROCEDURE — 99214 OFFICE O/P EST MOD 30 MIN: CPT | Performed by: INTERNAL MEDICINE

## 2024-09-23 RX ORDER — NITROGLYCERIN 0.4 MG/1
0.4 TABLET SUBLINGUAL
Qty: 30 TABLET | Refills: 0 | Status: SHIPPED | OUTPATIENT
Start: 2024-09-23

## 2024-09-23 RX ORDER — CARVEDILOL 3.12 MG/1
3.12 TABLET ORAL 2 TIMES DAILY WITH MEALS
Qty: 180 TABLET | Refills: 3 | Status: SHIPPED | OUTPATIENT
Start: 2024-09-23

## 2024-09-23 NOTE — PATIENT INSTRUCTIONS
You were seen today in the Cardiology office for follow up evaluation.     Please continue your current cardiac medications as prescribed.    Thank you for choosing [facility].    Please call our office or use [email or web address] with any questions.

## 2024-09-23 NOTE — PROGRESS NOTES
[facility]    Name:Addy Rich   DOS: 9/23/2024     Chief Complaint:   Chief Complaint   Patient presents with    Follow-up     Year - notes concerns with shoulder discomfort and SOB       HISTORY OF PRESENT ILLNESS:    HPI:  Addy Rich is a 58 y.o. male. He  has a past medical history of CAD (coronary artery disease), Myocardial infarction (HCC), Prediabetes, Presence of drug coated stent in LAD coronary artery (09/27/2022), and Unstable angina (HCC) (09/2022).    He presents for follow-up evaluation.  The patient last saw me in the office on 9/21/2023.  He has a history of ischemic cardiomyopathy in the setting of CAD status post drug-eluting stent to his LAD times 2 September 2022 at [facility] in the setting of unstable angina.  Subsequent recovery of LVEF, 50% by most recent transthoracic echo.    At the last office visit, I had referred him for repeat lipid testing.  His repeat lipid panel demonstrates good control, at goal on atorvastatin 80 mg once daily.    Today, the patient is here for annual follow-up.  He has no active new cardiopulmonary complaints.  He recalls a few isolated episodes as described.  1 episode included a sensation of nausea that was relieved by him eating.  Another episode included transient jaw pain lasting a few seconds when he was chewing food.  He otherwise denies chest pain, shortness of breath, diaphoresis, dizziness, palpitations, orthopnea, edema.  He has had no syncope.  No issues with bruising or bleeding on aspirin monotherapy.    The patient reports that he remains physically active.  He exercises almost every day, noting that he is able to row continuously for exercise up to 40 minutes at a time without experiencing angina or dyspnea.       ROS    ROS: Pertinent positives and negatives as described in History of Present Illness. Remainder of a 14 point review of systems was negative.     Allergies   Allergen Reactions    Sulfa  Antibiotics Hives        Current Outpatient Medications on File Prior to Visit   Medication Sig Dispense Refill    Aspirin Low Dose 81 MG EC tablet Take 81 mg by mouth daily      atorvastatin (LIPITOR) 80 mg tablet Take 1 tablet (80 mg total) by mouth daily at bedtime 90 tablet 3    carvedilol (COREG) 3.125 mg tablet TAKE 1 TABLET BY MOUTH TWICE DAILY WITH MEALS 14 tablet 0    folic acid (FOLVITE) 1 mg tablet Take 800 mcg by mouth      glucosamine-chondroitin 500-400 MG tablet Take 1 tablet by mouth      losartan (COZAAR) 25 mg tablet Take 1 tablet (25 mg total) by mouth daily 30 tablet 11    nitroglycerin (NITROSTAT) 0.4 mg SL tablet Place 1 tablet (0.4 mg total) under the tongue every 5 (five) minutes as needed for chest pain If no relief with 2 doses, call 911. 100 tablet 0     No current facility-administered medications on file prior to visit.       Past Medical History:   Diagnosis Date    CAD (coronary artery disease)     Myocardial infarction (HCC)     Prediabetes     Presence of drug coated stent in LAD coronary artery 09/27/2022    JOSETTE x 2 to proximal and mid LAD @ [employer]. Overlapping. 38 and 16mm.    Unstable angina (HCC) 09/2022       Past Surgical History:   Procedure Laterality Date    CHOLECYSTECTOMY      CORONARY ANGIOPLASTY WITH STENT PLACEMENT      VASECTOMY         No family history on file.    Social History     Socioeconomic History    Marital status: /Civil Union     Spouse name: Not on file    Number of children: Not on file    Years of education: Not on file    Highest education level: Not on file   Occupational History    Not on file   Tobacco Use    Smoking status: Never    Smokeless tobacco: Never   Vaping Use    Vaping status: Never Used   Substance and Sexual Activity    Alcohol use: Yes     Comment: social    Drug use: Never    Sexual activity: Not on file   Other Topics Concern    Not on file   Social History Narrative    Not on file     Social Determinants of Health  "    Financial Resource Strain: Not on file   Food Insecurity: Not on file   Transportation Needs: Not on file   Physical Activity: Not on file   Stress: Not on file   Social Connections: Not on file   Intimate Partner Violence: Not on file   Housing Stability: Not on file       OBJECTIVE:    /74 (BP Location: Left arm, Patient Position: Sitting, Cuff Size: Standard)   Pulse 57   Ht 5' 11\" (1.803 m)   Wt 97.1 kg (214 lb)   BMI 29.85 kg/m²      BP Readings from Last 3 Encounters:   09/23/24 125/74   02/21/24 130/92   09/21/23 104/64       Wt Readings from Last 3 Encounters:   09/23/24 97.1 kg (214 lb)   09/21/23 89.2 kg (196 lb 9.6 oz)   06/14/23 88.5 kg (195 lb)         Physical Exam  Vitals reviewed.   Constitutional:       General: He is not in acute distress.     Appearance: Normal appearance. He is not diaphoretic.   HENT:      Head: Normocephalic and atraumatic.   Eyes:      Conjunctiva/sclera: Conjunctivae normal.   Neck:      Vascular: No JVD.   Cardiovascular:      Rate and Rhythm: Normal rate and regular rhythm.      Pulses: Normal pulses.      Heart sounds: Normal heart sounds. No murmur heard.     No friction rub. No gallop.   Pulmonary:      Effort: Pulmonary effort is normal.      Breath sounds: Normal breath sounds. No wheezing, rhonchi or rales.   Musculoskeletal:      Right lower leg: No edema.      Left lower leg: No edema.   Skin:     General: Skin is warm and dry.   Neurological:      General: No focal deficit present.      Mental Status: He is alert and oriented to person, place, and time.   Psychiatric:         Mood and Affect: Mood normal.         Behavior: Behavior normal.                                                       Cardiac testing:   EKG reviewed personally: Sinus bradycardia with occasional PVCs.        LABS:  Lab Results   Component Value Date    BUN 19 05/09/2023    CREATININE 1.07 05/09/2023    CALCIUM 10.1 05/09/2023    K 4.2 05/09/2023    CO2 28 05/09/2023     " 05/09/2023    ALKPHOS 76 05/09/2023    AST 28 05/09/2023    ALT 30 05/09/2023        Lab Results   Component Value Date    WBC 5.28 05/09/2023    HGB 15.6 05/09/2023    HCT 46.5 05/09/2023    MCV 94 05/09/2023     05/09/2023       Lab Results   Component Value Date    HDL 67 09/25/2023    LDLCALC 48 09/25/2023    TRIG 124 09/25/2023       Lab Results   Component Value Date    HGBA1C 6.0 09/28/2022         ASSESSMENT/PLAN:  Diagnoses and all orders for this visit:    Coronary artery disease involving native coronary artery of native heart without angina pectoris  -     POCT ECG  -     carvedilol (COREG) 3.125 mg tablet; Take 1 tablet (3.125 mg total) by mouth 2 (two) times a day with meals  -     nitroglycerin (NITROSTAT) 0.4 mg SL tablet; Place 1 tablet (0.4 mg total) under the tongue every 5 (five) minutes as needed for chest pain If no relief with 2 doses, call 911.    Myocardial infarction involving left anterior descending (LAD) coronary artery, unspecified MI type (HCC)  -     nitroglycerin (NITROSTAT) 0.4 mg SL tablet; Place 1 tablet (0.4 mg total) under the tongue every 5 (five) minutes as needed for chest pain If no relief with 2 doses, call 911.    Presence of drug coated stent in LAD coronary artery    Paroxysmal atrial tachycardia  -     POCT ECG    Mixed hyperlipidemia    Ischemic cardiomyopathy  -     carvedilol (COREG) 3.125 mg tablet; Take 1 tablet (3.125 mg total) by mouth 2 (two) times a day with meals    58-year-old male presents for follow-up evaluation.  Doing well overall, no new cardiopulmonary complaints.  He has no angina, and retains very good functional capacity without symptoms.    Plan:  Continue aspirin 81 mg once daily.  Patient educated on the importance of continued compliance with his medication.  Continue atorvastatin 80 mg once daily.  Continue blood pressure control with carvedilol and losartan as outlined above.  Refill provided for supple nitroglycerin.  Patient has never  "utilized this medication, but does state that the supply he has at home is .          Gracie Olivares MD      Portions of the record may have been created with voice recognition software. Occasional wrong word or \"sound alike\" substitutions may have occurred due to the inherent limitations of voice recognition software. Please review the chart carefully and recognize, using context, where substitutions/typographical errors may have occurred.     "

## 2024-09-24 DIAGNOSIS — E78.2 MIXED HYPERLIPIDEMIA: ICD-10-CM

## 2024-09-24 RX ORDER — ATORVASTATIN CALCIUM 80 MG/1
80 TABLET, FILM COATED ORAL
Qty: 30 TABLET | Refills: 0 | Status: SHIPPED | OUTPATIENT
Start: 2024-09-24

## 2024-10-19 DIAGNOSIS — E78.2 MIXED HYPERLIPIDEMIA: ICD-10-CM

## 2024-10-21 RX ORDER — ATORVASTATIN CALCIUM 80 MG/1
80 TABLET, FILM COATED ORAL
Qty: 30 TABLET | Refills: 0 | Status: SHIPPED | OUTPATIENT
Start: 2024-10-21

## 2024-11-15 DIAGNOSIS — E78.2 MIXED HYPERLIPIDEMIA: ICD-10-CM

## 2024-11-15 DIAGNOSIS — E78.2 MIXED HYPERLIPIDEMIA: Primary | ICD-10-CM

## 2024-11-15 RX ORDER — ATORVASTATIN CALCIUM 80 MG/1
80 TABLET, FILM COATED ORAL
Qty: 30 TABLET | Refills: 3 | Status: SHIPPED | OUTPATIENT
Start: 2024-11-15

## 2024-12-05 ENCOUNTER — TELEPHONE (OUTPATIENT)
Age: 59
End: 2024-12-05

## 2024-12-05 NOTE — TELEPHONE ENCOUNTER
Received call from pt.  He would like to check with Dr. Olivares if he can use a tens machine for his bad back.  He wants to make sure there's no cardiac reason why he could not.    Please advise.

## 2024-12-15 DIAGNOSIS — I25.10 CORONARY ARTERY DISEASE INVOLVING NATIVE CORONARY ARTERY OF NATIVE HEART WITHOUT ANGINA PECTORIS: ICD-10-CM

## 2024-12-17 ENCOUNTER — OFFICE VISIT (OUTPATIENT)
Dept: OBGYN CLINIC | Facility: MEDICAL CENTER | Age: 59
End: 2024-12-17
Payer: COMMERCIAL

## 2024-12-17 ENCOUNTER — APPOINTMENT (OUTPATIENT)
Dept: RADIOLOGY | Facility: MEDICAL CENTER | Age: 59
End: 2024-12-17
Payer: COMMERCIAL

## 2024-12-17 VITALS
SYSTOLIC BLOOD PRESSURE: 134 MMHG | HEIGHT: 71 IN | WEIGHT: 211 LBS | BODY MASS INDEX: 29.54 KG/M2 | HEART RATE: 74 BPM | DIASTOLIC BLOOD PRESSURE: 80 MMHG

## 2024-12-17 DIAGNOSIS — G89.29 CHRONIC BILATERAL LOW BACK PAIN, UNSPECIFIED WHETHER SCIATICA PRESENT: ICD-10-CM

## 2024-12-17 DIAGNOSIS — M54.50 CHRONIC BILATERAL LOW BACK PAIN, UNSPECIFIED WHETHER SCIATICA PRESENT: Primary | ICD-10-CM

## 2024-12-17 DIAGNOSIS — G89.29 CHRONIC BILATERAL LOW BACK PAIN, UNSPECIFIED WHETHER SCIATICA PRESENT: Primary | ICD-10-CM

## 2024-12-17 DIAGNOSIS — M47.816 FACET ARTHROPATHY, LUMBAR: ICD-10-CM

## 2024-12-17 DIAGNOSIS — M54.50 CHRONIC BILATERAL LOW BACK PAIN, UNSPECIFIED WHETHER SCIATICA PRESENT: ICD-10-CM

## 2024-12-17 PROCEDURE — 72100 X-RAY EXAM L-S SPINE 2/3 VWS: CPT

## 2024-12-17 PROCEDURE — 99204 OFFICE O/P NEW MOD 45 MIN: CPT | Performed by: EMERGENCY MEDICINE

## 2024-12-17 RX ORDER — METHYLPREDNISOLONE 4 MG/1
TABLET ORAL
Qty: 1 EACH | Refills: 0 | Status: SHIPPED | OUTPATIENT
Start: 2024-12-17

## 2024-12-17 RX ORDER — LOSARTAN POTASSIUM 25 MG/1
25 TABLET ORAL DAILY
Qty: 30 TABLET | Refills: 0 | Status: SHIPPED | OUTPATIENT
Start: 2024-12-17

## 2024-12-17 RX ORDER — MAGNESIUM GLYCINATE 100 MG
CAPSULE ORAL
COMMUNITY
Start: 2024-10-07

## 2024-12-17 NOTE — PROGRESS NOTES
Assessment/Plan:    Diagnoses and all orders for this visit:    Chronic bilateral low back pain, unspecified whether sciatica present  -     XR spine lumbar 2 or 3 views injury; Future  -     methylPREDNISolone 4 MG tablet therapy pack; Use as directed on package  -     MRI lumbar spine wo contrast; Future  -     Ambulatory referral to Spine & Pain Management; Future    Facet arthropathy, lumbar  -     methylPREDNISolone 4 MG tablet therapy pack; Use as directed on package  -     MRI lumbar spine wo contrast; Future  -     Ambulatory referral to Spine & Pain Management; Future    Other orders  -     Magnesium Glycinate 100 MG CAPS    MRI of the lumbar spine for chronic intermittent worsening lower back pain with intermittent radiation into the buttocks and hamstrings.  X-ray of the lumbar spine was obtained today and reviewed with mild degenerative changes.    He is unable to take NSAIDs we will start a Medrol Dosepak and refer to pain management.  Patient would like to be a little bit more aggressive with his diagnosis and treatment given his condition seems to be getting worse over the years.    No follow-ups on file.      Subjective:   Patient ID: Addy Rich is a 59 y.o. male.    NP presents for chronic intermittent LBP ongoing for 15 years with worsening last 2-3 weeks radiation into buttocks.  No significant N/T, feels left leg is weak, no incontinence, denies f/c.  Taking Tylenol PRN.  Has been lying down to help pain, prefers to stand.          Review of Systems    The following portions of the patient's chart were reviewed and updated as appropriate:   Allergy:    Allergies   Allergen Reactions    Sulfa Antibiotics Hives       Medications:    Current Outpatient Medications:     Aspirin Low Dose 81 MG EC tablet, Take 81 mg by mouth daily, Disp: , Rfl:     atorvastatin (LIPITOR) 80 mg tablet, TAKE 1 TABLET BY MOUTH ONCE DAILY AT BEDTIME, Disp: 30 tablet, Rfl: 3    carvedilol (COREG) 3.125 mg tablet, Take  "1 tablet (3.125 mg total) by mouth 2 (two) times a day with meals, Disp: 180 tablet, Rfl: 3    folic acid (FOLVITE) 1 mg tablet, Take 800 mcg by mouth, Disp: , Rfl:     glucosamine-chondroitin 500-400 MG tablet, Take 1 tablet by mouth, Disp: , Rfl:     losartan (COZAAR) 25 mg tablet, Take 1 tablet by mouth once daily, Disp: 30 tablet, Rfl: 0    Magnesium Glycinate 100 MG CAPS, , Disp: , Rfl:     methylPREDNISolone 4 MG tablet therapy pack, Use as directed on package, Disp: 1 each, Rfl: 0    nitroglycerin (NITROSTAT) 0.4 mg SL tablet, Place 1 tablet (0.4 mg total) under the tongue every 5 (five) minutes as needed for chest pain If no relief with 2 doses, call 911., Disp: 30 tablet, Rfl: 0    Patient Active Problem List   Diagnosis    Coronary artery disease involving native coronary artery without angina pectoris    Presence of drug coated stent in LAD coronary artery    Mixed hyperlipidemia    Prediabetes    Ischemic cardiomyopathy       Objective:  /80   Pulse 74   Ht 5' 11\" (1.803 m)   Wt 95.7 kg (211 lb)   BMI 29.43 kg/m²     Right Knee Exam     Muscle Strength   The patient has normal right knee strength.      Left Knee Exam     Muscle Strength   The patient has normal left knee strength.      Back Exam     Muscle Strength   The patient has normal back strength.    Tests   Straight leg raise right: positive  Straight leg raise left: negative    Reflexes   Patellar:  normal    Other   Toe walk: normal  Heel walk: normal  Gait: antalgic             Physical Exam  Musculoskeletal:      Lumbar back: Positive right straight leg raise test. Negative left straight leg raise test.           Neurologic Exam    Procedures    I have personally reviewed pertinent films in PACS and my interpretation is Xrays Lumbar Spine: normal curvature, disc spaces and vertebral disc heights preserved, no acute findings such as fracture or osseous lesions .            Past Medical History:   Diagnosis Date    CAD (coronary " artery disease)     Myocardial infarction (HCC)     Prediabetes     Presence of drug coated stent in LAD coronary artery 09/27/2022    JOSETTE x 2 to proximal and mid LAD @ [employer]. Overlapping. 38 and 16mm.    Unstable angina (HCC) 09/2022       Past Surgical History:   Procedure Laterality Date    CHOLECYSTECTOMY      CORONARY ANGIOPLASTY WITH STENT PLACEMENT      VASECTOMY         Social History     Socioeconomic History    Marital status: /Civil Union     Spouse name: Not on file    Number of children: Not on file    Years of education: Not on file    Highest education level: Not on file   Occupational History    Not on file   Tobacco Use    Smoking status: Never    Smokeless tobacco: Never   Vaping Use    Vaping status: Never Used   Substance and Sexual Activity    Alcohol use: Yes     Comment: social    Drug use: Never    Sexual activity: Not on file   Other Topics Concern    Not on file   Social History Narrative    Not on file     Social Drivers of Health     Financial Resource Strain: Not on file   Food Insecurity: Not on file   Transportation Needs: Not on file   Physical Activity: Not on file   Stress: Not on file   Social Connections: Not on file   Intimate Partner Violence: Not on file   Housing Stability: Not on file       History reviewed. No pertinent family history.

## 2024-12-17 NOTE — PATIENT INSTRUCTIONS
"While taking the oral steroid Medrol Dosepak, do not take any NSAIDs such as Advil, Motrin, ibuprofen, Motrin, Aleve, naproxen, Celebrex or Meloxicam.  You can restart the NSAIDs after you finish the steroids.    However you may take Tylenol 500mg every 4-6 hours as needed OR max 1,000mg per dose up to 3 times per day for a total of 3,000mg per day  While taking oral steroids, you may experience mild side effects such as feeling jittery or flushing.  Please call if your side effects are significant or you have any questions.     Day 1: 8 mg by mouth before breakfast, 4 mg after lunch and after dinner, and 8 mg at bedtime  Day 2: 4 mg by mouth before breakfast, after lunch, and after dinner and 8 mg at bedtime  Day 3: 4 mg by mouth before breakfast, after lunch, after dinner, and at bedtime  Day 4: 4 mg by mouth before breakfast, after lunch, and at bedtime  Day 5: 4 mg by mouth before breakfast and at bedtime  Day 6: 4 mg by mouth before breakfast      Patient Education     Low back pain - Discharge instructions   The Basics   Written by the doctors and editors at [facility]   What are discharge instructions? -- Discharge instructions are information about how to take care of yourself after getting medical care for a health problem.  What is low back pain? -- Low back pain is pain or discomfort in the lower part of your back. Many people have low back pain at some point, and it most often gets better on its own. Many different things can cause low back pain. Most of the time, doctors do not know the exact cause.  Back pain can happen if you strain a muscle. This is often what has happened when a person \"throws out\" their back. This refers to pain that starts suddenly after physical activity, like lifting something heavy or bending the back.  Back pain can also happen if you have (figure 1):   Damaged, bulging, or torn discs   Arthritis affecting the joints of the spine   Bony growths on the vertebrae that crowd nearby " "nerves   A \"compression fracture\" due to osteoporosis (a condition that makes your bones weak)   A vertebra out of place   Narrowing in the spinal canal   A tumor or infection (but this is very rare)  How do I care for myself at home? -- Ask the doctor or nurse what you should do when you go home. Make sure that you understand exactly what you need to do to care for yourself. Ask questions if there is anything you do not understand.  You should also:   Use heat on your back for short periods of time, if it helps with pain. Put a heating pad (on the low setting) on your back for 20 minutes at a time a few times each day. Never go to sleep with heat on your back.   Try to stay as active as you can without causing too much pain, if your doctor or nurse said that it is OK. If your pain is severe, you might need to rest for a day or 2. But it's important to get back to walking and moving as soon as possible. Try to keep doing your normal daily activities. Get up and move around gently during the day as you are able.   Slowly start to increase your activity level as you are able to. If something causes your pain to come back or get worse, stop and go back to doing easier activities that did not hurt.   Avoid sitting or standing in 1 position for a long time. You might want to sleep with a pillow under or between your knees if this eases your pain.   Take a medicine like ibuprofen (sample brand names: Advil, Motrin) or naproxen (brand name: Aleve) for pain, if needed. These are nonsteroidal antiinflammatory drugs (\"NSAIDs\"). They might work better than acetaminophen for low back pain.   Talk to your doctor or nurse before trying any of the following. These treatments might help you feel better for a little while:   Spinal manipulation - This is when a chiropractor, physical therapist, or other professional moves or \"adjusts\" the joints of your back.   Acupuncture - This is when someone who knows traditional Chinese " medicine inserts tiny needles through your skin to block pain signals.   Massage therapy - The massage therapist manipulates your muscles and soft tissues to decrease muscle tension and increase relaxation.  What follow-up care do I need? -- Your doctor or nurse will tell you if you need to make a follow-up appointment. If so, make sure that you know when and where to go. The doctor might suggest that you see a physical therapist to learn exercises to help with your back pain.  When should I call the doctor? -- Call for emergency help right away (in the [place] and [place], call 9-1-1) if:   You are unable to walk, or cannot control your bowels or bladder.   You develop a fever of 100.4°F (38°C) or higher, chills, or night sweats.  Call your doctor for advice if:   Your legs are numb, weak, or tingly.   Your pain is getting worse, even with medicines and rest.   You develop a new rash.  All topics are updated as new evidence becomes available and our peer review process is complete.  This topic retrieved from [employer] on: May 15, 2024.  Topic 660575 Version 1.0  Release: 32.4.3 - C32.134  © 2024 UpToDate, Inc. and/or its affiliates. All rights reserved.  figure 1: Anatomy of the back     This drawing shows the different parts of the back. Back pain can be caused by problems with the muscles, ligaments, discs, bones (vertebrae), or nerves.  Graphic 14480 Version 6.0  Consumer Information Use and Disclaimer   Disclaimer: This generalized information is a limited summary of diagnosis, treatment, and/or medication information. It is not meant to be comprehensive and should be used as a tool to help the user understand and/or assess potential diagnostic and treatment options. It does NOT include all information about conditions, treatments, medications, side effects, or risks that may apply to a specific patient. It is not intended to be medical advice or a substitute for the medical advice, diagnosis, or treatment of a health  care provider based on the health care provider's examination and assessment of a patient's specific and unique circumstances. Patients must speak with a health care provider for complete information about their health, medical questions, and treatment options, including any risks or benefits regarding use of medications. This information does not endorse any treatments or medications as safe, effective, or approved for treating a specific patient. UpToDate, Inc. and its affiliates disclaim any warranty or liability relating to this information or the use thereof.The use of this information is governed by the Terms of Use, available at https://www.GoTable.com/en/know/clinical-effectiveness-terms. 2024© UpToDate, Inc. and its affiliates and/or licensors. All rights reserved.  Copyright   © 2024 UpToDate, Inc. and/or its affiliates. All rights reserved.

## 2024-12-29 ENCOUNTER — HOSPITAL ENCOUNTER (OUTPATIENT)
Dept: MRI IMAGING | Facility: HOSPITAL | Age: 59
Discharge: HOME | End: 2024-12-29
Payer: COMMERCIAL

## 2024-12-29 DIAGNOSIS — M47.816 FACET ARTHROPATHY, LUMBAR: ICD-10-CM

## 2024-12-29 DIAGNOSIS — M54.50 CHRONIC BILATERAL LOW BACK PAIN, UNSPECIFIED WHETHER SCIATICA PRESENT: ICD-10-CM

## 2024-12-29 DIAGNOSIS — G89.29 CHRONIC BILATERAL LOW BACK PAIN, UNSPECIFIED WHETHER SCIATICA PRESENT: ICD-10-CM

## 2024-12-29 PROCEDURE — 72148 MRI LUMBAR SPINE W/O DYE: CPT

## 2024-12-31 ENCOUNTER — TELEPHONE (OUTPATIENT)
Dept: OBGYN CLINIC | Facility: HOSPITAL | Age: 59
End: 2024-12-31

## 2024-12-31 NOTE — TELEPHONE ENCOUNTER
Doctor: Darline   Caller Name: [facility]   #: [phone] opt #[phone]     [facility] called to speak to clinical team regarding MRI ordered by provider.  Results are available in chart for provider to view.     No call back necessary unless provider has questions.

## 2025-01-07 ENCOUNTER — TELEPHONE (OUTPATIENT)
Age: 60
End: 2025-01-07

## 2025-01-07 ENCOUNTER — CONSULT (OUTPATIENT)
Dept: PAIN MEDICINE | Facility: CLINIC | Age: 60
End: 2025-01-07
Payer: COMMERCIAL

## 2025-01-07 VITALS — TEMPERATURE: 97.2 F | HEART RATE: 75 BPM | HEIGHT: 71 IN | WEIGHT: 212 LBS | BODY MASS INDEX: 29.68 KG/M2

## 2025-01-07 DIAGNOSIS — M54.50 CHRONIC BILATERAL LOW BACK PAIN WITHOUT SCIATICA: ICD-10-CM

## 2025-01-07 DIAGNOSIS — M51.16 LUMBAR DISC DISEASE WITH RADICULOPATHY: Primary | ICD-10-CM

## 2025-01-07 DIAGNOSIS — M47.816 FACET ARTHROPATHY, LUMBAR: ICD-10-CM

## 2025-01-07 DIAGNOSIS — G89.29 CHRONIC BILATERAL LOW BACK PAIN WITHOUT SCIATICA: ICD-10-CM

## 2025-01-07 PROCEDURE — 99244 OFF/OP CNSLTJ NEW/EST MOD 40: CPT | Performed by: ANESTHESIOLOGY

## 2025-01-07 NOTE — TELEPHONE ENCOUNTER
Caller: preeti    Doctor: hermes    Reason for call: pt wanted to let you know that the procedure scheduled today was denied.    Call back#: [phone]

## 2025-01-07 NOTE — PROGRESS NOTES
Assessment  1. Lumbar disc disease with radiculopathy    2. Chronic bilateral low back pain without sciatica    3. Facet arthropathy, lumbar        Plan    The patient's pain persists despite time, relative rest, activity modification and therapy. I believe that he may benefit from a lumbar epidural steroid injection to diminish any inflammatory component of his pain. I will initially use an translaminar approach.     If the patient does not receive significant pain relief following the initial injection, I would consider his pain being generated by the lumbar facet joints and would consider diagnostic lumbar medial branch blockade.      Thoroughly review his MRI which revealed multilevel facet arthropathy with disc bulge but also potential kidney cysts and he is following up with the primary care physician today to review and potentially consider further imaging.    In the office today, we reviewed the nature of the patient's pathology in depth using diagrams and models. I discussed the approach I would use for the epidural steroid injection and provided literature for home review. The patient understands the risks associated with the procedure including but not limited to bleeding, infection, tissue injury, exacerbation of symptoms, allergic reaction, spinal headache, and paralysis and provided verbal consent today.    My impressions and treatment recommendations were discussed in detail with the patient who verbalized understanding and had no further questions.  Discharge instructions were provided. I personally saw and examined the patient and I agree with the above discussed plan of care.  This note is created using dictation transcription.  It may contain typographical errors, grammatical errors, improperly dictated words, background noise and other errors.    Orders Placed This Encounter   Procedures    FL spine and pain procedure     Standing Status:   Future     Expected Date:   1/7/2025     Expiration  Date:   1/7/2029     Reason for Exam::   LESI     Anticoagulant hold needed?:   no     No orders of the defined types were placed in this encounter.    Referred By: Shiv Gregorio MD  History of Present Illness    Addy Rich is a 59 y.o. male with longstanding history of low back pain that radiates into his thigh.  It is waxed and waned over the years but after leaving physical therapy he has 1 months of severe low back pain.  An MRI performed.  His pain is severe rates it as 2 out of 10 on the visual line currently but just finished a Medrol Dosepak with significant eversion of the day living activities.  His difficulty sitting for long periods of time as well as extension.  Describes pain as cramping shooting sharp he has weakness subjectively of the lower limbs with his associated pain.  He denies any loss of bowel bladder function.    I have personally reviewed and/or updated the patient's past medical history, past surgical history, family history, social history, current medications, allergies, and vital signs today.     Review of Systems   Constitutional:  Negative for fever and unexpected weight change.   HENT:  Negative for trouble swallowing.    Eyes:  Negative for visual disturbance.   Respiratory:  Negative for shortness of breath and wheezing.    Cardiovascular:  Negative for chest pain and palpitations.   Gastrointestinal:  Negative for constipation, diarrhea, nausea and vomiting.   Endocrine: Negative for cold intolerance, heat intolerance and polydipsia.   Genitourinary:  Negative for difficulty urinating and frequency.   Musculoskeletal:  Positive for arthralgias. Negative for gait problem, joint swelling and myalgias.   Skin:  Positive for rash.   Neurological:  Negative for dizziness, seizures, syncope, weakness and headaches.   Hematological:  Does not bruise/bleed easily.   Psychiatric/Behavioral:  Negative for dysphoric mood.    All other systems reviewed and are negative.      Patient Active  Problem List   Diagnosis    Coronary artery disease involving native coronary artery without angina pectoris    Presence of drug coated stent in LAD coronary artery    Mixed hyperlipidemia    Prediabetes    Ischemic cardiomyopathy       Past Medical History:   Diagnosis Date    CAD (coronary artery disease)     Myocardial infarction (HCC)     Prediabetes     Presence of drug coated stent in LAD coronary artery 09/27/2022    JOSETTE x 2 to proximal and mid LAD @ [facility]. Overlapping. 38 and 16mm.    Unstable angina (HCC) 09/2022       Past Surgical History:   Procedure Laterality Date    CHOLECYSTECTOMY      CORONARY ANGIOPLASTY WITH STENT PLACEMENT      VASECTOMY         History reviewed. No pertinent family history.    Social History     Occupational History    Not on file   Tobacco Use    Smoking status: Never    Smokeless tobacco: Never   Vaping Use    Vaping status: Never Used   Substance and Sexual Activity    Alcohol use: Yes     Comment: social    Drug use: Never    Sexual activity: Not on file       Current Outpatient Medications on File Prior to Visit   Medication Sig    Aspirin Low Dose 81 MG EC tablet Take 81 mg by mouth daily    atorvastatin (LIPITOR) 80 mg tablet TAKE 1 TABLET BY MOUTH ONCE DAILY AT BEDTIME    carvedilol (COREG) 3.125 mg tablet Take 1 tablet (3.125 mg total) by mouth 2 (two) times a day with meals    folic acid (FOLVITE) 1 mg tablet Take 800 mcg by mouth    glucosamine-chondroitin 500-400 MG tablet Take 1 tablet by mouth    losartan (COZAAR) 25 mg tablet Take 1 tablet by mouth once daily    Magnesium Glycinate 100 MG CAPS     nitroglycerin (NITROSTAT) 0.4 mg SL tablet Place 1 tablet (0.4 mg total) under the tongue every 5 (five) minutes as needed for chest pain If no relief with 2 doses, call 911.    [DISCONTINUED] methylPREDNISolone 4 MG tablet therapy pack Use as directed on package     No current facility-administered medications on file prior to visit.       Allergies   Allergen  "Reactions    Sulfa Antibiotics Hives       Physical Exam    Pulse 75   Temp (!) 97.2 °F (36.2 °C)   Ht 5' 11\" (1.803 m)   Wt 96.2 kg (212 lb)   BMI 29.57 kg/m²     Constitutional: normal, well developed, well nourished, alert, in no distress and non-toxic and no overt pain behavior.  Eyes: anicteric  HEENT: grossly intact  Neck: supple, symmetric, trachea midline and no masses   Pulmonary:even and unlabored  Cardiovascular:No edema or pitting edema present  Skin:Normal without rashes or lesions and well hydrated  Psychiatric:Mood and affect appropriate  Neurologic:Cranial Nerves II-XII grossly intact  Musculoskeletal:normal, difficulty going from sitting to standing sitting position; no obvious skin lesions or erythema lumbar sacral spine; mild tenderness in lumbar paravertebrals; deep tendon reflexes are diminished but symmetrical bilateral patellar and achilles; no focal motor deficit appreciated lower limbs; negative bilateral straight leg raising.    Imaging  MRI LUMBAR SPINE WITHOUT CONTRAST @  12-29-24     INDICATION: M54.50: Low back pain, unspecified  G89.29: Other chronic pain  M47.816: Spondylosis without myelopathy or radiculopathy, lumbar region.     COMPARISON: Lumbar spine radiographs 12/17/2024.     TECHNIQUE:  Multiplanar, multisequence imaging of the lumbar spine was performed. .        IMAGE QUALITY: Motion-degraded, which reduces diagnostic sensitivity.     FINDINGS:     VERTEBRAL BODIES: Normal alignment. No acute fracture.     SACRUM: No acute abnormality.     DISTAL CORD AND CONUS:  Normal size and signal within the distal cord and conus.     PARASPINAL SOFT TISSUES: No acute abnormality.     LOWER THORACIC DISC SPACES: No significant spondylotic changes.     LUMBAR DISC SPACES: Multilevel mild disc desiccation.     L1-L2: No significant neuroforaminal narrowing or spinal canal stenosis.     L2-L3: No significant neuroforaminal narrowing or spinal canal stenosis.     L3-L4: No significant " neuroforaminal narrowing or spinal canal stenosis.     L4-L5: Disc bulge with superimposed central disc herniation. No significant neuroforaminal narrowing. Mild spinal canal stenosis.     L5-S1: Disc bulge. No significant neuroforaminal narrowing or spinal canal stenosis.     OTHER FINDINGS: Scattered T2 hyperintensities in the kidneys, statistically cysts, noting a fluid/hematocrit level involving a lesion on the right, which could reflect a proteinaceous/hemorrhagic cyst, though noting solid renal lesion/neoplasm is not   excluded by this study.     IMPRESSION:     Multilevel spondylotic changes of the lumbar spine. See narrative above for full details.     Scattered T2 hyperintensities in the kidneys, statistically cysts, noting a fluid/hematocrit level involving a lesion on the right, which could reflect a proteinaceous/hemorrhagic cyst, though noting solid renal lesion/neoplasm is not excluded by this   study. Recommend further evaluation with dedicated CT or MRI of the abdomen with renal protocol, as clinically appropriate.      XR SPINE LUMBAR 2 OR 3 VIEWS INJURY @  12-19-24     INDICATION: M54.50: Low back pain, unspecified  G89.29: Other chronic pain.     COMPARISON: None     FINDINGS:     No acute fracture. Intact pedicles.     Five non-rib-bearing lumbar vertebral bodies.     Normal alignment.     Age-appropriate lumbar degenerative changes.     Unremarkable soft tissues.     IMPRESSION:     No acute osseous abnormality.      I have personally reviewed pertinent films in PACS and my interpretation is as noted above.

## 2025-01-07 NOTE — TELEPHONE ENCOUNTER
Spoke with patient advised that we are still trying to get it approved. While on phone patient did express that the pain does go into his buttocks, hips and thighs. Advised patient I would let doctor and ins company know and see what happens

## 2025-01-08 LAB — HBA1C MFR BLD HPLC: 6 %

## 2025-01-10 ENCOUNTER — HOSPITAL ENCOUNTER (OUTPATIENT)
Dept: RADIOLOGY | Facility: CLINIC | Age: 60
Discharge: HOME | End: 2025-01-10
Payer: COMMERCIAL

## 2025-01-10 VITALS
SYSTOLIC BLOOD PRESSURE: 120 MMHG | DIASTOLIC BLOOD PRESSURE: 77 MMHG | OXYGEN SATURATION: 96 % | HEART RATE: 65 BPM | TEMPERATURE: 98.2 F | RESPIRATION RATE: 18 BRPM

## 2025-01-10 DIAGNOSIS — M51.16 LUMBAR DISC DISEASE WITH RADICULOPATHY: ICD-10-CM

## 2025-01-10 PROCEDURE — 62323 NJX INTERLAMINAR LMBR/SAC: CPT | Performed by: ANESTHESIOLOGY

## 2025-01-10 RX ORDER — METHYLPREDNISOLONE ACETATE 80 MG/ML
80 INJECTION, SUSPENSION INTRA-ARTICULAR; INTRALESIONAL; INTRAMUSCULAR; PARENTERAL; SOFT TISSUE ONCE
Status: COMPLETED | OUTPATIENT
Start: 2025-01-10 | End: 2025-01-10

## 2025-01-10 RX ADMIN — IOHEXOL 1 ML: 300 INJECTION, SOLUTION INTRAVENOUS at 08:40

## 2025-01-10 RX ADMIN — METHYLPREDNISOLONE ACETATE 80 MG: 80 INJECTION, SUSPENSION INTRA-ARTICULAR; INTRALESIONAL; INTRAMUSCULAR; SOFT TISSUE at 08:40

## 2025-01-10 NOTE — DISCHARGE INSTRUCTIONS
Epidural Steroid Injection   WHAT YOU NEED TO KNOW:   An epidural steroid injection (KURT) is a procedure to inject steroid medicine into the epidural space. The epidural space is between your spinal cord and vertebrae. Steroids reduce inflammation and fluid buildup in your spine that may be causing pain. You may be given pain medicine along with the steroids.          ACTIVITY  Do not drive or operate machinery today.  No strenuous activity today - bending, lifting, etc.  You may resume normal activites starting tomorrow - start slowly and as tolerated.  You may shower today, but no tub baths or hot tubs.  You may have numbness for several hours from the local anesthetic. Please use caution and common sense, especially with weight-bearing activities.    CARE OF THE INJECTION SITE  If you have soreness or pain, apply ice to the area today (20 minutes on/20 minutes off).  Starting tomorrow, you may use warm, moist heat or ice if needed.  You may have an increase or change in your discomfort for 36-48 hours after your treatment.  Apply ice and continue with any pain medication you have been prescribed.  Notify [facility] if you have any of the following: redness, drainage, swelling, headache, stiff neck or fever above 100°F.    SPECIAL INSTRUCTIONS  Our office will contact you in approximately 14 days for a progress report.    MEDICATIONS  Continue to take all routine medications.  Our office may have instructed you to hold some medications.    As no general anesthesia was used in today's procedure, you should not experience any side effects related to anesthesia.     If you are diabetic, the steroids used in today's injection may temporarily increase your blood sugar levels after the first few days after your injection. Please keep a close eye on your sugars and alert the doctor who manages your diabetes if your sugars are significantly high from your baseline or you are symptomatic.     If you have a  problem specifically related to your procedure, please call our office at [phone].  Problems not related to your procedure should be directed to your primary care physician.

## 2025-01-10 NOTE — H&P
Assessment  1. Lumbar disc disease with radiculopathy    2. Chronic bilateral low back pain without sciatica    3. Facet arthropathy, lumbar          Plan     The patient's pain persists despite time, relative rest, activity modification and therapy. I believe that he may benefit from a lumbar epidural steroid injection to diminish any inflammatory component of his pain. I will initially use an translaminar approach.      If the patient does not receive significant pain relief following the initial injection, I would consider his pain being generated by the lumbar facet joints and would consider diagnostic lumbar medial branch blockade.       Thoroughly review his MRI which revealed multilevel facet arthropathy with disc bulge but also potential kidney cysts and he is following up with the primary care physician today to review and potentially consider further imaging.     In the office today, we reviewed the nature of the patient's pathology in depth using diagrams and models. I discussed the approach I would use for the epidural steroid injection and provided literature for home review. The patient understands the risks associated with the procedure including but not limited to bleeding, infection, tissue injury, exacerbation of symptoms, allergic reaction, spinal headache, and paralysis and provided verbal consent today.     My impressions and treatment recommendations were discussed in detail with the patient who verbalized understanding and had no further questions.  Discharge instructions were provided. I personally saw and examined the patient and I agree with the above discussed plan of care.  This note is created using dictation transcription.  It may contain typographical errors, grammatical errors, improperly dictated words, background noise and other errors.           Orders Placed This Encounter   Procedures    FL spine and pain procedure       Standing Status:   Future       Expected Date:   1/7/2025        Expiration Date:   1/7/2029       Reason for Exam::   LESI       Anticoagulant hold needed?:   no      No orders of the defined types were placed in this encounter.     Referred By: Shiv Gregorio MD  History of Present Illness     Addy Rich is a 59 y.o. male with longstanding history of low back pain that radiates into his thigh.  It is waxed and waned over the years but after leaving physical therapy he has 1 months of severe low back pain.  An MRI performed.  His pain is severe rates it as 2 out of 10 on the visual line currently but just finished a Medrol Dosepak with significant eversion of the day living activities.  His difficulty sitting for long periods of time as well as extension.  Describes pain as cramping shooting sharp he has weakness subjectively of the lower limbs with his associated pain.  He denies any loss of bowel bladder function.     I have personally reviewed and/or updated the patient's past medical history, past surgical history, family history, social history, current medications, allergies, and vital signs today.      Review of Systems   Constitutional:  Negative for fever and unexpected weight change.   HENT:  Negative for trouble swallowing.    Eyes:  Negative for visual disturbance.   Respiratory:  Negative for shortness of breath and wheezing.    Cardiovascular:  Negative for chest pain and palpitations.   Gastrointestinal:  Negative for constipation, diarrhea, nausea and vomiting.   Endocrine: Negative for cold intolerance, heat intolerance and polydipsia.   Genitourinary:  Negative for difficulty urinating and frequency.   Musculoskeletal:  Positive for arthralgias. Negative for gait problem, joint swelling and myalgias.   Skin:  Positive for rash.   Neurological:  Negative for dizziness, seizures, syncope, weakness and headaches.   Hematological:  Does not bruise/bleed easily.   Psychiatric/Behavioral:  Negative for dysphoric mood.    All other systems reviewed and are  negative.        Problem List       Patient Active Problem List   Diagnosis    Coronary artery disease involving native coronary artery without angina pectoris    Presence of drug coated stent in LAD coronary artery    Mixed hyperlipidemia    Prediabetes    Ischemic cardiomyopathy            Medical History        Past Medical History:   Diagnosis Date    CAD (coronary artery disease)      Myocardial infarction (HCC)      Prediabetes      Presence of drug coated stent in LAD coronary artery 09/27/2022     JOSETTE x 2 to proximal and mid LAD @ [facility]. Overlapping. 38 and 16mm.    Unstable angina (HCC) 09/2022            Surgical History         Past Surgical History:   Procedure Laterality Date    CHOLECYSTECTOMY        CORONARY ANGIOPLASTY WITH STENT PLACEMENT        VASECTOMY                Family History   History reviewed. No pertinent family history.        Social History            Occupational History    Not on file   Tobacco Use    Smoking status: Never    Smokeless tobacco: Never   Vaping Use    Vaping status: Never Used   Substance and Sexual Activity    Alcohol use: Yes       Comment: social    Drug use: Never    Sexual activity: Not on file              Current Outpatient Medications on File Prior to Visit   Medication Sig    Aspirin Low Dose 81 MG EC tablet Take 81 mg by mouth daily    atorvastatin (LIPITOR) 80 mg tablet TAKE 1 TABLET BY MOUTH ONCE DAILY AT BEDTIME    carvedilol (COREG) 3.125 mg tablet Take 1 tablet (3.125 mg total) by mouth 2 (two) times a day with meals    folic acid (FOLVITE) 1 mg tablet Take 800 mcg by mouth    glucosamine-chondroitin 500-400 MG tablet Take 1 tablet by mouth    losartan (COZAAR) 25 mg tablet Take 1 tablet by mouth once daily    Magnesium Glycinate 100 MG CAPS      nitroglycerin (NITROSTAT) 0.4 mg SL tablet Place 1 tablet (0.4 mg total) under the tongue every 5 (five) minutes as needed for chest pain If no relief with 2 doses, call 911.    [DISCONTINUED]  "methylPREDNISolone 4 MG tablet therapy pack Use as directed on package      No current facility-administered medications on file prior to visit.         Allergies        Allergies   Allergen Reactions    Sulfa Antibiotics Hives            Physical Exam     Pulse 75   Temp (!) 97.2 °F (36.2 °C)   Ht 5' 11\" (1.803 m)   Wt 96.2 kg (212 lb)   BMI 29.57 kg/m²      Constitutional: normal, well developed, well nourished, alert, in no distress and non-toxic and no overt pain behavior.  Eyes: anicteric  HEENT: grossly intact  Neck: supple, symmetric, trachea midline and no masses   Pulmonary:even and unlabored  Cardiovascular:No edema or pitting edema present  Skin:Normal without rashes or lesions and well hydrated  Psychiatric:Mood and affect appropriate  Neurologic:Cranial Nerves II-XII grossly intact  Musculoskeletal:normal, difficulty going from sitting to standing sitting position; no obvious skin lesions or erythema lumbar sacral spine; mild tenderness in lumbar paravertebrals; deep tendon reflexes are diminished but symmetrical bilateral patellar and achilles; no focal motor deficit appreciated lower limbs; negative bilateral straight leg raising.     Imaging  MRI LUMBAR SPINE WITHOUT CONTRAST @  12-29-24     INDICATION: M54.50: Low back pain, unspecified  G89.29: Other chronic pain  M47.816: Spondylosis without myelopathy or radiculopathy, lumbar region.     COMPARISON: Lumbar spine radiographs 12/17/2024.     TECHNIQUE:  Multiplanar, multisequence imaging of the lumbar spine was performed. .        IMAGE QUALITY: Motion-degraded, which reduces diagnostic sensitivity.     FINDINGS:     VERTEBRAL BODIES: Normal alignment. No acute fracture.     SACRUM: No acute abnormality.     DISTAL CORD AND CONUS:  Normal size and signal within the distal cord and conus.     PARASPINAL SOFT TISSUES: No acute abnormality.     LOWER THORACIC DISC SPACES: No significant spondylotic changes.     LUMBAR DISC SPACES: Multilevel " mild disc desiccation.     L1-L2: No significant neuroforaminal narrowing or spinal canal stenosis.     L2-L3: No significant neuroforaminal narrowing or spinal canal stenosis.     L3-L4: No significant neuroforaminal narrowing or spinal canal stenosis.     L4-L5: Disc bulge with superimposed central disc herniation. No significant neuroforaminal narrowing. Mild spinal canal stenosis.     L5-S1: Disc bulge. No significant neuroforaminal narrowing or spinal canal stenosis.     OTHER FINDINGS: Scattered T2 hyperintensities in the kidneys, statistically cysts, noting a fluid/hematocrit level involving a lesion on the right, which could reflect a proteinaceous/hemorrhagic cyst, though noting solid renal lesion/neoplasm is not   excluded by this study.     IMPRESSION:     Multilevel spondylotic changes of the lumbar spine. See narrative above for full details.     Scattered T2 hyperintensities in the kidneys, statistically cysts, noting a fluid/hematocrit level involving a lesion on the right, which could reflect a proteinaceous/hemorrhagic cyst, though noting solid renal lesion/neoplasm is not excluded by this   study. Recommend further evaluation with dedicated CT or MRI of the abdomen with renal protocol, as clinically appropriate.        XR SPINE LUMBAR 2 OR 3 VIEWS INJURY @  12-19-24     INDICATION: M54.50: Low back pain, unspecified  G89.29: Other chronic pain.     COMPARISON: None     FINDINGS:     No acute fracture. Intact pedicles.     Five non-rib-bearing lumbar vertebral bodies.     Normal alignment.     Age-appropriate lumbar degenerative changes.     Unremarkable soft tissues.     IMPRESSION:     No acute osseous abnormality.      I have personally reviewed pertinent films in PACS and my interpretation is as noted above.         Asa:2

## 2025-01-10 NOTE — NURSING NOTE
Dc instructions reviewed, written info provided. Pt verbalized understanding and dc to home with  - no further questions.

## 2025-01-13 DIAGNOSIS — Z00.6 ENCOUNTER FOR EXAMINATION FOR NORMAL COMPARISON OR CONTROL IN CLINICAL RESEARCH PROGRAM: ICD-10-CM

## 2025-01-14 ENCOUNTER — APPOINTMENT (OUTPATIENT)
Dept: LAB | Facility: HOSPITAL | Age: 60
End: 2025-01-14

## 2025-01-14 DIAGNOSIS — Z00.6 ENCOUNTER FOR EXAMINATION FOR NORMAL COMPARISON OR CONTROL IN CLINICAL RESEARCH PROGRAM: ICD-10-CM

## 2025-01-14 PROCEDURE — 36415 COLL VENOUS BLD VENIPUNCTURE: CPT

## 2025-01-16 ENCOUNTER — TELEPHONE (OUTPATIENT)
Age: 60
End: 2025-01-16

## 2025-01-16 DIAGNOSIS — I25.10 CORONARY ARTERY DISEASE INVOLVING NATIVE CORONARY ARTERY OF NATIVE HEART WITHOUT ANGINA PECTORIS: ICD-10-CM

## 2025-01-16 RX ORDER — LOSARTAN POTASSIUM 25 MG/1
25 TABLET ORAL DAILY
Qty: 90 TABLET | Refills: 3 | Status: SHIPPED | OUTPATIENT
Start: 2025-01-16

## 2025-01-23 ENCOUNTER — HOSPITAL ENCOUNTER (OUTPATIENT)
Dept: ULTRASOUND IMAGING | Facility: HOSPITAL | Age: 60
End: 2025-01-23
Payer: COMMERCIAL

## 2025-01-23 ENCOUNTER — HOSPITAL ENCOUNTER (OUTPATIENT)
Dept: MRI IMAGING | Facility: HOSPITAL | Age: 60
End: 2025-01-23
Payer: COMMERCIAL

## 2025-01-23 DIAGNOSIS — R22.1 LOCALIZED SWELLING, MASS AND LUMP, NECK: ICD-10-CM

## 2025-01-23 DIAGNOSIS — N28.9 RENAL LESION: ICD-10-CM

## 2025-01-23 PROCEDURE — 76536 US EXAM OF HEAD AND NECK: CPT

## 2025-01-23 PROCEDURE — 74183 MRI ABD W/O CNTR FLWD CNTR: CPT

## 2025-01-23 PROCEDURE — A9585 GADOBUTROL INJECTION: HCPCS | Performed by: RADIOLOGY

## 2025-01-23 RX ORDER — GADOBUTROL 604.72 MG/ML
9 INJECTION INTRAVENOUS
Status: COMPLETED | OUTPATIENT
Start: 2025-01-23 | End: 2025-01-23

## 2025-01-23 RX ADMIN — GADOBUTROL 9 ML: 604.72 INJECTION INTRAVENOUS at 18:12

## 2025-01-24 ENCOUNTER — TELEPHONE (OUTPATIENT)
Dept: PAIN MEDICINE | Facility: CLINIC | Age: 60
End: 2025-01-24

## 2025-01-24 LAB
APOB+LDLR+PCSK9 GENE MUT ANL BLD/T: NOT DETECTED
BRCA1+BRCA2 DEL+DUP + FULL MUT ANL BLD/T: NOT DETECTED
MLH1+MSH2+MSH6+PMS2 GN DEL+DUP+FUL M: NOT DETECTED

## 2025-02-07 ENCOUNTER — TRANSCRIBE ORDERS (OUTPATIENT)
Dept: RADIOLOGY | Facility: HOSPITAL | Age: 60
End: 2025-02-07

## 2025-02-07 DIAGNOSIS — K11.8 MASS OF SALIVARY GLAND: Primary | ICD-10-CM

## 2025-02-10 DIAGNOSIS — I25.10 CORONARY ARTERY DISEASE INVOLVING NATIVE CORONARY ARTERY OF NATIVE HEART WITHOUT ANGINA PECTORIS: ICD-10-CM

## 2025-02-10 DIAGNOSIS — I25.5 ISCHEMIC CARDIOMYOPATHY: ICD-10-CM

## 2025-02-12 RX ORDER — CARVEDILOL 3.12 MG/1
3.12 TABLET ORAL 2 TIMES DAILY WITH MEALS
Qty: 180 TABLET | Refills: 1 | Status: SHIPPED | OUTPATIENT
Start: 2025-02-12

## 2025-02-25 ENCOUNTER — HOSPITAL ENCOUNTER (OUTPATIENT)
Dept: ULTRASOUND IMAGING | Facility: HOSPITAL | Age: 60
Discharge: HOME | End: 2025-02-25
Payer: COMMERCIAL

## 2025-02-25 DIAGNOSIS — K11.8 MASS OF SALIVARY GLAND: ICD-10-CM

## 2025-02-25 PROCEDURE — 88173 CYTOPATH EVAL FNA REPORT: CPT | Performed by: PATHOLOGY

## 2025-02-25 PROCEDURE — 88342 IMHCHEM/IMCYTCHM 1ST ANTB: CPT | Performed by: PATHOLOGY

## 2025-02-25 PROCEDURE — 10005 FNA BX W/US GDN 1ST LES: CPT

## 2025-02-25 PROCEDURE — 88305 TISSUE EXAM BY PATHOLOGIST: CPT | Performed by: PATHOLOGY

## 2025-02-25 PROCEDURE — 88172 CYTP DX EVAL FNA 1ST EA SITE: CPT | Performed by: PATHOLOGY

## 2025-02-25 PROCEDURE — 88333 PATH CONSLTJ SURG CYTO XM 1: CPT | Performed by: PATHOLOGY

## 2025-02-25 PROCEDURE — 88344 IMHCHEM/IMCYTCHM EA MLT ANTB: CPT | Performed by: PATHOLOGY

## 2025-02-25 PROCEDURE — 88341 IMHCHEM/IMCYTCHM EA ADD ANTB: CPT | Performed by: PATHOLOGY

## 2025-02-25 RX ORDER — LIDOCAINE HYDROCHLORIDE 10 MG/ML
5 INJECTION, SOLUTION EPIDURAL; INFILTRATION; INTRACAUDAL; PERINEURAL ONCE
Status: COMPLETED | OUTPATIENT
Start: 2025-02-25 | End: 2025-02-25

## 2025-02-25 RX ADMIN — LIDOCAINE HYDROCHLORIDE 5 ML: 10 INJECTION, SOLUTION EPIDURAL; INFILTRATION; INTRACAUDAL; PERINEURAL at 13:15

## 2025-02-28 ENCOUNTER — HOSPITAL ENCOUNTER (OUTPATIENT)
Dept: RADIOLOGY | Facility: CLINIC | Age: 60
Discharge: HOME | End: 2025-02-28
Payer: COMMERCIAL

## 2025-02-28 VITALS
RESPIRATION RATE: 16 BRPM | HEART RATE: 60 BPM | TEMPERATURE: 98 F | DIASTOLIC BLOOD PRESSURE: 76 MMHG | SYSTOLIC BLOOD PRESSURE: 128 MMHG | OXYGEN SATURATION: 95 %

## 2025-02-28 DIAGNOSIS — M51.16 LUMBAR DISC DISEASE WITH RADICULOPATHY: ICD-10-CM

## 2025-02-28 PROCEDURE — 62323 NJX INTERLAMINAR LMBR/SAC: CPT | Performed by: ANESTHESIOLOGY

## 2025-02-28 RX ORDER — METHYLPREDNISOLONE ACETATE 80 MG/ML
80 INJECTION, SUSPENSION INTRA-ARTICULAR; INTRALESIONAL; INTRAMUSCULAR; PARENTERAL; SOFT TISSUE ONCE
Status: COMPLETED | OUTPATIENT
Start: 2025-02-28 | End: 2025-02-28

## 2025-02-28 RX ADMIN — METHYLPREDNISOLONE ACETATE 80 MG: 80 INJECTION, SUSPENSION INTRA-ARTICULAR; INTRALESIONAL; INTRAMUSCULAR; SOFT TISSUE at 08:17

## 2025-02-28 RX ADMIN — IOHEXOL 1 ML: 300 INJECTION, SOLUTION INTRAVENOUS at 08:17

## 2025-02-28 NOTE — DISCHARGE INSTR - LAB
Epidural Steroid Injection   WHAT YOU NEED TO KNOW:   An epidural steroid injection (KURT) is a procedure to inject steroid medicine into the epidural space. The epidural space is between your spinal cord and vertebrae. Steroids reduce inflammation and fluid buildup in your spine that may be causing pain. You may be given pain medicine along with the steroids.          ACTIVITY  Do not drive or operate machinery today.  No strenuous activity today - bending, lifting, etc.  You may resume normal activites starting tomorrow - start slowly and as tolerated.  You may shower today, but no tub baths or hot tubs.  You may have numbness for several hours from the local anesthetic. Please use caution and common sense, especially with weight-bearing activities.    CARE OF THE INJECTION SITE  If you have soreness or pain, apply ice to the area today (20 minutes on/20 minutes off).  Starting tomorrow, you may use warm, moist heat or ice if needed.  You may have an increase or change in your discomfort for 36-48 hours after your treatment.  Apply ice and continue with any pain medication you have been prescribed.  Notify the [facility] if you have any of the following: redness, drainage, swelling, headache, stiff neck or fever above 100°F.    SPECIAL INSTRUCTIONS  Our office will contact you in approximately 14 days for a progress report.    MEDICATIONS  Continue to take all routine medications.  Our office may have instructed you to hold some medications.    As no general anesthesia was used in today's procedure, you should not experience any side effects related to anesthesia.     If you are diabetic, the steroids used in today's injection may temporarily increase your blood sugar levels after the first few days after your injection. Please keep a close eye on your sugars and alert the doctor who manages your diabetes if your sugars are significantly high from your baseline or you are symptomatic.     If you have a  problem specifically related to your procedure, please call our office at [phone].  Problems not related to your procedure should be directed to your primary care physician.

## 2025-02-28 NOTE — H&P
History of Present Illness: The patient is a 59 y.o. male who presents with complaints of low back and leg pain.    Past Medical History:   Diagnosis Date    CAD (coronary artery disease)     Myocardial infarction (HCC)     Prediabetes     Presence of drug coated stent in LAD coronary artery 09/27/2022    JOSETTE x 2 to proximal and mid LAD @ [place]. Overlapping. 38 and 16mm.    Unstable angina (HCC) 09/2022       Past Surgical History:   Procedure Laterality Date    CHOLECYSTECTOMY      CORONARY ANGIOPLASTY WITH STENT PLACEMENT      US GUIDED LYMPH NODE BIOPSY LEFT  2/25/2025    VASECTOMY           Current Outpatient Medications:     Aspirin Low Dose 81 MG EC tablet, Take 81 mg by mouth daily, Disp: , Rfl:     atorvastatin (LIPITOR) 80 mg tablet, TAKE 1 TABLET BY MOUTH ONCE DAILY AT BEDTIME, Disp: 30 tablet, Rfl: 3    carvedilol (COREG) 3.125 mg tablet, TAKE 1 TABLET BY MOUTH TWICE DAILY WITH MEALS, Disp: 180 tablet, Rfl: 1    folic acid (FOLVITE) 1 mg tablet, Take 800 mcg by mouth, Disp: , Rfl:     glucosamine-chondroitin 500-400 MG tablet, Take 1 tablet by mouth, Disp: , Rfl:     losartan (COZAAR) 25 mg tablet, Take 1 tablet (25 mg total) by mouth daily, Disp: 90 tablet, Rfl: 3    Magnesium Glycinate 100 MG CAPS, , Disp: , Rfl:     nitroglycerin (NITROSTAT) 0.4 mg SL tablet, Place 1 tablet (0.4 mg total) under the tongue every 5 (five) minutes as needed for chest pain If no relief with 2 doses, call 911., Disp: 30 tablet, Rfl: 0    Current Facility-Administered Medications:     iohexol (OMNIPAQUE) 300 mg/mL injection 1 mL, 1 mL, Epidural, Once, Gurwinder Aguero DO    methylPREDNISolone acetate (DEPO-MEDROL) injection 80 mg, 80 mg, Epidural, Once, Gurwinder Aguero DO    Allergies   Allergen Reactions    Sulfa Antibiotics Hives       Physical Exam:   Vitals:    02/28/25 0759   BP: 115/75   Pulse: 75   Resp: 16   Temp: 98 °F (36.7 °C)   SpO2: 95%     General: Awake, Alert, Oriented x 3, Mood and affect appropriate  Respiratory:  Respirations even and unlabored  Cardiovascular: Peripheral pulses intact; no edema  Musculoskeletal Exam: Normal gait    ASA Score: 2    Patient/Chart Verification  Patient ID Verified: Verbal  ID Band Applied: No  Consents Confirmed: To be obtained in the Procedural area  H&P( within 30 days) Verified: To be obtained in the Procedural area  Interval H&P(within 24 hr) Complete (required for Outpatients and Surgery Admit only): To be obtained in the Procedural area  Allergies Reviewed: Yes  Anticoag/NSAID held?: NA  Currently on antibiotics?: No    Assessment:   1. Lumbar disc disease with radiculopathy        Plan: [id number] [id number]

## 2025-03-21 ENCOUNTER — OFFICE VISIT (OUTPATIENT)
Dept: PAIN MEDICINE | Facility: CLINIC | Age: 60
End: 2025-03-21
Payer: COMMERCIAL

## 2025-03-21 VITALS
HEART RATE: 67 BPM | BODY MASS INDEX: 31.22 KG/M2 | WEIGHT: 223 LBS | OXYGEN SATURATION: 95 % | TEMPERATURE: 98.2 F | HEIGHT: 71 IN

## 2025-03-21 DIAGNOSIS — M51.16 LUMBAR DISC DISEASE WITH RADICULOPATHY: ICD-10-CM

## 2025-03-21 DIAGNOSIS — M47.816 LUMBAR SPONDYLOSIS: Primary | ICD-10-CM

## 2025-03-21 PROCEDURE — 99213 OFFICE O/P EST LOW 20 MIN: CPT | Performed by: PHYSICIAN ASSISTANT

## 2025-03-21 NOTE — PROGRESS NOTES
Assessment:  1. Lumbar spondylosis    2. Lumbar disc disease with radiculopathy        Plan:  While the patient was in the office today, I did have a thorough conversation regarding their chronic pain syndrome, medication management, and treatment plan options.    The patient is status post a series of 2 L5-S1 interlaminar epidural steroid injections and at this point is able to report approximately 80% reduction of pain.  Patient was made aware that injections can be repeated in the future, waiting 3 months in between procedures at this point.    Consider lumbar at diagnostic medial branch blocks/RFA in the future.  Educational literature provided on today's visit.    Recommend LSO brace for support and stabilization especially when working.    Continue home stretching exercises.    The patient will follow-up on a as needed basis. The patient was advised to contact the office should their symptoms worsen in the interim. The patient was agreeable and verbalized an understanding.        History of Present Illness:    The patient is a 59 y.o. male last seen on 2/28/2025 who presents for a follow up office visit in regards to chronic low back pain secondary to lumbar spondylosis/degenerative disc disease.  The patient currently reports no pain on today's visit, just describing occasional shooting pains without radiation in the legs.  On 2/28/2025 patient underwent his second L5-S1 interlaminar epidural steroid injection and is able to report approximately 80% relief.  Patient feels his residual pain is something he is able to manage without further intervention.  He is performing lumbar stretching exercises on a regular basis.      I have personally reviewed and/or updated the patient's past medical history, past surgical history, family history, social history, current medications, allergies, and vital signs today.       Review of Systems:    Review of Systems   Respiratory:  Negative for shortness of breath.     Cardiovascular:  Negative for chest pain.   Gastrointestinal:  Negative for constipation, diarrhea, nausea and vomiting.   Musculoskeletal:  Positive for gait problem. Negative for arthralgias, joint swelling and myalgias.   Skin:  Negative for rash.   Neurological:  Negative for dizziness, seizures and weakness.   All other systems reviewed and are negative.        Past Medical History:   Diagnosis Date   • CAD (coronary artery disease)    • Myocardial infarction (HCC)    • Prediabetes    • Presence of drug coated stent in LAD coronary artery 09/27/2022    JOSETTE x 2 to proximal and mid LAD @ [place]. Overlapping. 38 and 16mm.   • Unstable angina (HCC) 09/2022       Past Surgical History:   Procedure Laterality Date   • CHOLECYSTECTOMY     • CORONARY ANGIOPLASTY WITH STENT PLACEMENT     • US GUIDED LYMPH NODE BIOPSY LEFT  2/25/2025   • VASECTOMY         History reviewed. No pertinent family history.    Social History     Occupational History   • Not on file   Tobacco Use   • Smoking status: Never   • Smokeless tobacco: Never   Vaping Use   • Vaping status: Never Used   Substance and Sexual Activity   • Alcohol use: Yes     Comment: social   • Drug use: Never   • Sexual activity: Not on file         Current Outpatient Medications:   •  Aspirin Low Dose 81 MG EC tablet, Take 81 mg by mouth daily, Disp: , Rfl:   •  atorvastatin (LIPITOR) 80 mg tablet, TAKE 1 TABLET BY MOUTH ONCE DAILY AT BEDTIME, Disp: 30 tablet, Rfl: 3  •  carvedilol (COREG) 3.125 mg tablet, TAKE 1 TABLET BY MOUTH TWICE DAILY WITH MEALS, Disp: 180 tablet, Rfl: 1  •  folic acid (FOLVITE) 1 mg tablet, Take 800 mcg by mouth, Disp: , Rfl:   •  glucosamine-chondroitin 500-400 MG tablet, Take 1 tablet by mouth, Disp: , Rfl:   •  losartan (COZAAR) 25 mg tablet, Take 1 tablet (25 mg total) by mouth daily, Disp: 90 tablet, Rfl: 3  •  Magnesium Glycinate 100 MG CAPS, , Disp: , Rfl:   •  nitroglycerin (NITROSTAT) 0.4 mg SL tablet, Place 1 tablet (0.4 mg total)  "under the tongue every 5 (five) minutes as needed for chest pain If no relief with 2 doses, call 911., Disp: 30 tablet, Rfl: 0    Allergies   Allergen Reactions   • Sulfa Antibiotics Hives       Physical Exam:    Pulse 67   Temp 98.2 °F (36.8 °C)   Ht 5' 11\" (1.803 m)   Wt 101 kg (223 lb)   SpO2 95%   BMI 31.10 kg/m²     Constitutional:normal, well developed, well nourished, alert, in no distress and non-toxic and no overt pain behavior.  Eyes:anicteric  HEENT:grossly intact  Neck:supple, symmetric, trachea midline and no masses   Pulmonary:even and unlabored  Cardiovascular:No edema or pitting edema present  Skin:Normal without rashes or lesions and well hydrated  Psychiatric:Mood and affect appropriate  Neurologic:Cranial Nerves II-XII grossly intact  Musculoskeletal:normal      Imaging  No orders to display         No orders of the defined types were placed in this encounter.      "

## 2025-04-04 DIAGNOSIS — E78.2 MIXED HYPERLIPIDEMIA: ICD-10-CM

## 2025-04-04 RX ORDER — ATORVASTATIN CALCIUM 80 MG/1
80 TABLET, FILM COATED ORAL
Qty: 90 TABLET | Refills: 1 | Status: SHIPPED | OUTPATIENT
Start: 2025-04-04

## 2025-04-12 ENCOUNTER — APPOINTMENT (OUTPATIENT)
Dept: LAB | Facility: HOSPITAL | Age: 60
End: 2025-04-12
Payer: COMMERCIAL

## 2025-04-12 DIAGNOSIS — K11.8 MASS OF SALIVARY GLAND: ICD-10-CM

## 2025-04-12 DIAGNOSIS — R22.1 NECK MASS: ICD-10-CM

## 2025-04-12 LAB
BUN SERPL-MCNC: 23 MG/DL (ref 5–25)
CREAT SERPL-MCNC: 1.12 MG/DL (ref 0.6–1.3)
GFR SERPL CREATININE-BSD FRML MDRD: 71 ML/MIN/1.73SQ M

## 2025-04-12 PROCEDURE — 84520 ASSAY OF UREA NITROGEN: CPT

## 2025-04-12 PROCEDURE — 36415 COLL VENOUS BLD VENIPUNCTURE: CPT

## 2025-04-12 PROCEDURE — 82565 ASSAY OF CREATININE: CPT

## 2025-04-28 ENCOUNTER — HOSPITAL ENCOUNTER (OUTPATIENT)
Dept: MRI IMAGING | Facility: HOSPITAL | Age: 60
Discharge: HOME | End: 2025-04-28
Attending: OTOLARYNGOLOGY
Payer: COMMERCIAL

## 2025-04-28 DIAGNOSIS — R22.1 NECK MASS: ICD-10-CM

## 2025-04-28 DIAGNOSIS — K11.8 MASS OF SALIVARY GLAND: ICD-10-CM

## 2025-04-28 PROCEDURE — A9585 GADOBUTROL INJECTION: HCPCS | Performed by: OTOLARYNGOLOGY

## 2025-04-28 PROCEDURE — 70543 MRI ORBT/FAC/NCK W/O &W/DYE: CPT

## 2025-04-28 RX ORDER — GADOBUTROL 604.72 MG/ML
9 INJECTION INTRAVENOUS
Status: COMPLETED | OUTPATIENT
Start: 2025-04-28 | End: 2025-04-28

## 2025-04-28 RX ADMIN — GADOBUTROL 9 ML: 604.72 INJECTION INTRAVENOUS at 13:33

## 2025-05-08 ENCOUNTER — HOSPITAL ENCOUNTER (OUTPATIENT)
Dept: HOSPITAL 99 - GI | Age: 60
Discharge: HOME | End: 2025-05-08
Payer: COMMERCIAL

## 2025-05-08 DIAGNOSIS — D12.2: ICD-10-CM

## 2025-05-08 DIAGNOSIS — K63.5: ICD-10-CM

## 2025-05-08 DIAGNOSIS — Z83.719: ICD-10-CM

## 2025-05-08 DIAGNOSIS — K57.30: ICD-10-CM

## 2025-05-08 DIAGNOSIS — Z12.11: Primary | ICD-10-CM

## 2025-05-08 DIAGNOSIS — K64.8: ICD-10-CM

## 2025-05-08 PROCEDURE — 45380 COLONOSCOPY AND BIOPSY: CPT

## 2025-05-08 PROCEDURE — 88305 TISSUE EXAM BY PATHOLOGIST: CPT

## 2025-06-24 ENCOUNTER — TELEPHONE (OUTPATIENT)
Age: 60
End: 2025-06-24

## 2025-06-24 NOTE — TELEPHONE ENCOUNTER
Caller: Patient    Doctor: Dr. Aguero    Reason for call: patient calling to request a repeat injection    Please assist    Call back#: [phone]

## 2025-06-25 NOTE — TELEPHONE ENCOUNTER
S/w pt, requesting another injection for c/o pain in his low back, midline and going down his posterior legs, R / L alternating, stopping above his knees. Pt confirmed his last A1c was 6.0 on 1/8/25 and he takes asa 81 daily, no fish oil. Advised pt, the writer will d/w MG / SL and this office will cb to advise of the next steps. Pt verbalized understanding and appreciation.         Flip 1/10/25, 2/28/25 with 80% improvement at his 3/21/25 ov.

## 2025-06-25 NOTE — TELEPHONE ENCOUNTER
PRE OP INSTRUCTIONS:           Hold medication  x _ full days prior, last dose on _             Patient advised to hold medication from Date till their appointment at that time instructions to restart will be given.    Patient stated verbal understanding. Aware that nursing WILL call to review hold dates as well    -If you are on prescription blood thinners, you may have to hold the medication for several days before the procedure.    Please call the office to discuss medication holds at [phone].  -Do not eat or drink ONE HOUR prior to your procedure. If you are diabetic, may follow regular breakfast/lunch schedule and take usual    diabetic medications.  -Lumbar( low back) procedure, please wear comfortable slacks/pants.  -Cervical (neck) procedure, please wear a shirt/blouse that is easy to remove. PATIENT INSTRUCTED TO STOP ALL NSAID'S 4 DAYS             PRIOR TO PROCEDURE EXCEPT FOR IBUPROFEN IT CAN BE TAKEN UP TO 24 HOURS PRIOR TO PROCEDURE.   -A  is required to take you home form your procedure.  -Continue all to take prescribed medication the day of your procedure, including blood pressure medications.  -If you are prescribe antibiotics, have an active infection or have an open wound, please contact the office at [phone].  -Please refrain from any vaccinations two days before and two days after injection.  -Insurance authorization received in not a guarantee of payment per your insurance company's authorization disclaimer and it is    your responsibility to verify your benefits.          -If you have any questions about the instructions, please call me at [phone]     Explained KURT as an injection of steroids into an affected area to reduce swelling and provide relief. This procedure does not provide instant relief.   Allow 3-5 days for the steroid to begin to work. During this time, there may be changes in your pain, it may even get worse before it gets better.    This office will fu in 7  days to determine the effectiveness of the procedure. Be advised, the procedure may need to be repeated to provide adequate relief.   There is also a chance that the procedures will fail to relieve pain. There are few restrictions after the procedure. The day of the procedure, please go home and rest. Do not drive.   Ok to resume normal activities 24 - 48 hours after the procedure as tolerated. Do not submerge the site for 2 days after the procedure.    Be aware, steroids will raise blood sugar levels for ~ 2 weeks after the procedure.

## 2025-07-14 ENCOUNTER — HOSPITAL ENCOUNTER (OUTPATIENT)
Dept: RADIOLOGY | Facility: CLINIC | Age: 60
Discharge: HOME | End: 2025-07-14
Attending: ANESTHESIOLOGY
Payer: COMMERCIAL

## 2025-07-14 VITALS
OXYGEN SATURATION: 96 % | SYSTOLIC BLOOD PRESSURE: 129 MMHG | HEART RATE: 63 BPM | RESPIRATION RATE: 18 BRPM | TEMPERATURE: 97.5 F | DIASTOLIC BLOOD PRESSURE: 86 MMHG

## 2025-07-14 DIAGNOSIS — M51.16 LUMBAR DISC DISEASE WITH RADICULOPATHY: ICD-10-CM

## 2025-07-14 PROCEDURE — 62323 NJX INTERLAMINAR LMBR/SAC: CPT | Performed by: ANESTHESIOLOGY

## 2025-07-14 RX ORDER — METHYLPREDNISOLONE ACETATE 80 MG/ML
80 INJECTION, SUSPENSION INTRA-ARTICULAR; INTRALESIONAL; INTRAMUSCULAR; PARENTERAL; SOFT TISSUE ONCE
Status: COMPLETED | OUTPATIENT
Start: 2025-07-14 | End: 2025-07-14

## 2025-07-14 RX ADMIN — IOHEXOL 1 ML: 300 INJECTION, SOLUTION INTRAVENOUS at 08:32

## 2025-07-14 RX ADMIN — METHYLPREDNISOLONE ACETATE 80 MG: 80 INJECTION, SUSPENSION INTRA-ARTICULAR; INTRALESIONAL; INTRAMUSCULAR; SOFT TISSUE at 08:32

## 2025-07-14 NOTE — DISCHARGE INSTR - LAB
Epidural Steroid Injection   WHAT YOU NEED TO KNOW:   An epidural steroid injection (KURT) is a procedure to inject steroid medicine into the epidural space. The epidural space is between your spinal cord and vertebrae. Steroids reduce inflammation and fluid buildup in your spine that may be causing pain. You may be given pain medicine along with the steroids.          ACTIVITY  Do not drive or operate machinery today.  No strenuous activity today - bending, lifting, etc.  You may resume normal activites starting tomorrow - start slowly and as tolerated.  You may shower today, but no tub baths or hot tubs.  You may have numbness for several hours from the local anesthetic. Please use caution and common sense, especially with weight-bearing activities.    CARE OF THE INJECTION SITE  If you have soreness or pain, apply ice to the area today (20 minutes on/20 minutes off).  Starting tomorrow, you may use warm, moist heat or ice if needed.  You may have an increase or change in your discomfort for 36-48 hours after your treatment.  Apply ice and continue with any pain medication you have been prescribed.  Notify the [facility] if you have any of the following: redness, drainage, swelling, headache, stiff neck or fever above 100°F.    SPECIAL INSTRUCTIONS  Our office will contact you in approximately 14 days for a progress report.    MEDICATIONS  Continue to take all routine medications.  Our office may have instructed you to hold some medications.    As no general anesthesia was used in today's procedure, you should not experience any side effects related to anesthesia.     If you are diabetic, the steroids used in today's injection may temporarily increase your blood sugar levels after the first few days after your injection. Please keep a close eye on your sugars and alert the doctor who manages your diabetes if your sugars are significantly high from your baseline or you are symptomatic.     If you have a  problem specifically related to your procedure, please call our office at [phone].  Problems not related to your procedure should be directed to your primary care physician.

## 2025-07-14 NOTE — H&P
History of Present Illness: The patient is a 59 y.o. male who presents with complaints of low back and leg pain.    Past Medical History[1]    Past Surgical History[2]    Current Medications[3]    Allergies[4]    Physical Exam:   General: Awake, Alert, Oriented x 3, Mood and affect appropriate  Respiratory: Respirations even and unlabored  Cardiovascular: Peripheral pulses intact; no edema  Musculoskeletal Exam: Decreased range of motion lumbar spine    ASA Score: 2    Patient/Chart Verification  Patient ID Verified: Verbal  ID Band Applied: No  Consents Confirmed: To be obtained in the Procedural area  H&P( within 30 days) Verified: To be obtained in the Procedural area  Interval H&P(within 24 hr) Complete (required for Outpatients and Surgery Admit only): To be obtained in the Procedural area  Allergies Reviewed: Yes  Anticoag/NSAID held?: NA  Currently on antibiotics?: No    Assessment:   1. Lumbar disc disease with radiculopathy        Plan: JAYSON [id number]         [1]   Past Medical History:  Diagnosis Date    CAD (coronary artery disease)     Diabetes mellitus (HCC) 2018    Family history    Head injury 1969    Heart disease 09/22    Hypertension 09/22    Myocardial infarction (HCC) 9/27/2022    Prediabetes     Presence of drug coated stent in LAD coronary artery 09/27/2022    JOSETTE x 2 to proximal and mid LAD @ [place]. Overlapping. 38 and 16mm.    Unstable angina (HCC) 09/2022   [2]   Past Surgical History:  Procedure Laterality Date    CHOLECYSTECTOMY      CORONARY ANGIOPLASTY WITH STENT PLACEMENT      US GUIDED LYMPH NODE BIOPSY LEFT  2/25/2025    VASECTOMY     [3]   Current Outpatient Medications:     Aspirin Low Dose 81 MG EC tablet, Take 81 mg by mouth in the morning., Disp: , Rfl:     atorvastatin (LIPITOR) 80 mg tablet, Take 1 tablet (80 mg total) by mouth daily at bedtime, Disp: 90 tablet, Rfl: 1    carvedilol (COREG) 3.125 mg tablet, TAKE 1 TABLET BY MOUTH TWICE DAILY WITH MEALS, Disp: 180 tablet, Rfl: 1     folic acid (FOLVITE) 1 mg tablet, Take 800 mcg by mouth, Disp: , Rfl:     glucosamine-chondroitin 500-400 MG tablet, Take 1 tablet by mouth, Disp: , Rfl:     losartan (COZAAR) 25 mg tablet, Take 1 tablet (25 mg total) by mouth daily, Disp: 90 tablet, Rfl: 3    Magnesium Glycinate 100 MG CAPS, , Disp: , Rfl:     nitroglycerin (NITROSTAT) 0.4 mg SL tablet, Place 1 tablet (0.4 mg total) under the tongue every 5 (five) minutes as needed for chest pain If no relief with 2 doses, call 911., Disp: 30 tablet, Rfl: 0    Current Facility-Administered Medications:     iohexol (OMNIPAQUE) 300 mg/mL injection 1 mL, 1 mL, Epidural, Once, Gurwinder Aguero DO    methylPREDNISolone acetate (DEPO-MEDROL) injection 80 mg, 80 mg, Epidural, Once, Gurwinder Aguero DO  [4]   Allergies  Allergen Reactions    Sulfa Antibiotics Hives

## 2025-07-17 ENCOUNTER — HOSPITAL ENCOUNTER (OUTPATIENT)
Dept: MRI IMAGING | Facility: HOSPITAL | Age: 60
End: 2025-07-17
Attending: INTERNAL MEDICINE
Payer: COMMERCIAL

## 2025-07-17 DIAGNOSIS — R16.0 HEPATOMEGALY, NOT ELSEWHERE CLASSIFIED: ICD-10-CM

## 2025-07-17 PROCEDURE — A9585 GADOBUTROL INJECTION: HCPCS | Performed by: FAMILY MEDICINE

## 2025-07-17 PROCEDURE — 74181 MRI ABDOMEN W/O CONTRAST: CPT

## 2025-07-17 RX ORDER — GADOBUTROL 604.72 MG/ML
9 INJECTION INTRAVENOUS
Status: COMPLETED | OUTPATIENT
Start: 2025-07-17 | End: 2025-07-17

## 2025-07-17 RX ADMIN — GADOBUTROL 9 ML: 604.72 INJECTION INTRAVENOUS at 09:46

## 2025-07-28 ENCOUNTER — TELEPHONE (OUTPATIENT)
Dept: PAIN MEDICINE | Facility: CLINIC | Age: 60
End: 2025-07-28